# Patient Record
Sex: MALE | Race: WHITE | Employment: OTHER | URBAN - METROPOLITAN AREA
[De-identification: names, ages, dates, MRNs, and addresses within clinical notes are randomized per-mention and may not be internally consistent; named-entity substitution may affect disease eponyms.]

---

## 2017-01-17 ENCOUNTER — OFFICE VISIT (OUTPATIENT)
Dept: INTERNAL MEDICINE CLINIC | Age: 77
End: 2017-01-17

## 2017-01-17 VITALS
TEMPERATURE: 97.4 F | HEIGHT: 70 IN | RESPIRATION RATE: 18 BRPM | DIASTOLIC BLOOD PRESSURE: 76 MMHG | SYSTOLIC BLOOD PRESSURE: 119 MMHG | OXYGEN SATURATION: 98 % | WEIGHT: 176.8 LBS | BODY MASS INDEX: 25.31 KG/M2 | HEART RATE: 63 BPM

## 2017-01-17 DIAGNOSIS — Z12.5 ENCOUNTER FOR PROSTATE CANCER SCREENING: ICD-10-CM

## 2017-01-17 DIAGNOSIS — F51.01 PRIMARY INSOMNIA: ICD-10-CM

## 2017-01-17 DIAGNOSIS — I25.83 CORONARY ARTERY DISEASE DUE TO LIPID RICH PLAQUE: Primary | ICD-10-CM

## 2017-01-17 DIAGNOSIS — Z95.1 S/P CABG X 4: ICD-10-CM

## 2017-01-17 DIAGNOSIS — Z00.00 ROUTINE MEDICAL EXAM: ICD-10-CM

## 2017-01-17 DIAGNOSIS — R35.0 URINARY FREQUENCY: ICD-10-CM

## 2017-01-17 DIAGNOSIS — J30.0 VASOMOTOR RHINITIS: ICD-10-CM

## 2017-01-17 DIAGNOSIS — E78.00 HYPERCHOLESTEROLEMIA: ICD-10-CM

## 2017-01-17 DIAGNOSIS — I25.10 CORONARY ARTERY DISEASE DUE TO LIPID RICH PLAQUE: Primary | ICD-10-CM

## 2017-01-17 RX ORDER — IPRATROPIUM BROMIDE 21 UG/1
SPRAY, METERED NASAL
Qty: 60 ML | Refills: 3 | Status: SHIPPED | OUTPATIENT
Start: 2017-01-17 | End: 2019-01-15 | Stop reason: SDUPTHER

## 2017-01-17 NOTE — PATIENT INSTRUCTIONS
TRY CLARITIN, ALLEGRA, OR ZYRTEC FOR NASAL SYMPTOMS. Insomnia: Care Instructions  Your Care Instructions  Insomnia is the inability to sleep well. It is a common problem for most people at some time. Insomnia may make it hard for you to get to sleep, stay asleep, or sleep as long as you need to. This can make you tired and grouchy during the day. It can also make you forgetful, less effective at work, and unhappy. Insomnia can be caused by conditions such as depression or anxiety. Pain can also affect your ability to sleep. When these problems are solved, the insomnia usually clears up. But sometimes bad sleep habits can cause insomnia. If insomnia is affecting your work or your enjoyment of life, you can take steps to improve your sleep. Follow-up care is a key part of your treatment and safety. Be sure to make and go to all appointments, and call your doctor if you are having problems. It's also a good idea to know your test results and keep a list of the medicines you take. How can you care for yourself at home? What to avoid  · Do not have drinks with caffeine, such as coffee or black tea, for 8 hours before bed. · Do not smoke or use other types of tobacco near bedtime. Nicotine is a stimulant and can keep you awake. · Avoid drinking alcohol late in the evening, because it can cause you to wake in the middle of the night. · Do not eat a big meal close to bedtime. If you are hungry, eat a light snack. · Do not drink a lot of water close to bedtime, because the need to urinate may wake you up during the night. · Do not read or watch TV in bed. Use the bed only for sleeping and sexual activity. What to try  · Go to bed at the same time every night, and wake up at the same time every morning. Do not take naps during the day. · Keep your bedroom quiet, dark, and cool. · Sleep on a comfortable pillow and mattress.   · If watching the clock makes you anxious, turn it facing away from you so you cannot see the time. · If you worry when you lie down, start a worry book. Well before bedtime, write down your worries, and then set the book and your concerns aside. · Try meditation or other relaxation techniques before you go to bed. · If you cannot fall asleep, get up and go to another room until you feel sleepy. Do something relaxing. Repeat your bedtime routine before you go to bed again. · Make your house quiet and calm about an hour before bedtime. Turn down the lights, turn off the TV, log off the computer, and turn down the volume on music. This can help you relax after a busy day. When should you call for help? Watch closely for changes in your health, and be sure to contact your doctor if:  · Your efforts to improve your sleep do not work. · Your insomnia gets worse. · You have been feeling down, depressed, or hopeless or have lost interest in things that you usually enjoy. Where can you learn more? Go to http://miranda-wilfredo.info/. Enter P513 in the search box to learn more about \"Insomnia: Care Instructions. \"  Current as of: July 26, 2016  Content Version: 11.1  © 6550-7359 [employer]. Care instructions adapted under license by [employer] (which disclaims liability or warranty for this information). If you have questions about a medical condition or this instruction, always ask your healthcare professional. [place] [address] any warranty or liability for your use of this information.

## 2017-01-17 NOTE — PROGRESS NOTES
He is a 77 y.o. male who presents for follow up on medications. Seen in 2016. He sleeps 4 hours at a time. Normal sleep latency. Sleep maintenance problems. Denies RLS. Some nocturia. Tried melatonin and benadryl in the past. Denies fatigue. Not interested in sleep medication. Some daytime naps. No caffeine. Reports rhinitis. Using atrovent with relief with postnasal drip. Prior flonase without relief. Has not tried over the counter antihistamines in the past and wants to try claritin. History of CAD, no sx with exertion. No falls. Gait normal. Denies chest pain, fatigue, or KHAN. Cannot tolerate statins. No prior niacin, \"I am afraid of that\". He states cholesterol does not cause heart disease. Back on aspirin. Sees cardiologist once a year. Walking one miles a day, brisk. Sees derm for skin cancer, had effudex treatment recently. ROS:  Constitutional: negative for fevers, chills, anorexia and weight loss, positive for insomnia  Eyes:   negative for visual disturbance,  irritation  ENT:   negative for tinnitus,sore throat, ear pain, sinus pain.  Positive for nasal congestion  Respiratory:  negative for cough, chest congestion, dyspnea,wheezing  CV:   negative for chest pain, palpitations, lower extremity edema  GI:   negative for nausea, vomiting, diarrhea, abdominal pain,melena  Genitourinary: negative for frequency, dysuria, hematuria  Musculoskel: negative for myalgias, arthralgias, back pain, muscle weakness, joint pain  Neurological:  negative for headaches, dizziness, focal weakness, numbness      Past Medical History   Diagnosis Date    Allergic rhinitis     Benign prostatic hypertrophy     CAD (coronary artery disease)      anterior MI ;  by cath 2008    Contact dermatitis and other eczema, due to unspecified cause      Actinic Keratoses Head, limbs    Depression     Hypercholesterolemia     Amando-Parkinson-White (WPW) syndrome      status post ablation Past Surgical History   Procedure Laterality Date    Endoscopy, colon, diagnostic  2004     neg; 10 years; Dr. Prasanna Martino Pr colon ca scrn not hi rsk ind  2/4/2014          Hx colonoscopy  2/5/14     Repeat in 5 years   [employer] Hx vasectomy  1987     Dr. Lainey Yin, artery-vein, four  2006    Pr cardiac surg procedure unlist  2004, 2006    Hx heent       left eye surgery (Veatrice Beltrán on the eye)    Hx orthopaedic       meniscus repair ? right       Family History   Problem Relation Age of Onset    Asthma Paternal Grandmother     Cancer Father      Brain    Cancer Mother      Pancreas CA    Cancer Paternal Uncle     Heart Disease Maternal Grandfather     Heart Disease Brother     Heart Attack Brother      at least 3 (first in his 45s)    Stroke Maternal Grandmother     No Known Problems Brother     No Known Problems Son     No Known Problems Son     No Known Problems Daughter        Social History     Social History    Marital status:      Spouse name: N/A    Number of children: N/A    Years of education: N/A     Occupational History    Not on file. Social History Main Topics    Smoking status: Never Smoker    Smokeless tobacco: Never Used    Alcohol use No    Drug use: No    Sexual activity: Not Currently     Partners: Female     Other Topics Concern    Not on file     Social History Narrative         Current Outpatient Prescriptions:     ipratropium (ATROVENT) 0.03 % nasal spray, 1-2 sprays three times a day as needed. , Disp: 60 mL, Rfl: 3    polyvinyl alcohol (LIQUIFILM TEARS) 1.4 % ophthalmic solution, Administer 2 Drops to both eyes every morning. Indications: DRY EYE, Disp: , Rfl:     ketoconazole (NIZORAL) 2 % topical cream, Apply  to affected area two (2) times daily as needed. For jock itch rash. Dispense 90 day supply, Disp: 90 g, Rfl: 3    aspirin delayed-release 81 mg tablet, Take 81 mg by mouth daily. , Disp: , Rfl:        Visit Vitals    /76 (BP 1 Location: Left arm, BP Patient Position: Sitting)    Pulse 63    Temp 97.4 °F (36.3 °C) (Oral)    Resp 18    Ht 5' 10\" (1.778 m)    Wt 176 lb 12.8 oz (80.2 kg)    SpO2 98%    BMI 25.37 kg/m2       Physical Examination:   General - Well appearing male  HEENT - PERRL, TM no erythema/opacification, OP no erythema or exudate, MMM  Neck - supple, no bruits, no TMG, no LAD  Pulm - clear to auscultation bilaterally  Cardio - RRR, normal S1 S2, no murmur  Abd - soft, nontender, no masses, no HSM  Extrem - no edema, +2 distal pulses     Assessment/Plan:    1. Vasomotor rhinitis- trial of claritin    - ipratropium (ATROVENT) 0.03 % nasal spray; 1-2 sprays three times a day as needed. Dispense: 60 mL; Refill: 3    2. Routine medical exam- Recommend heart healthy diet and regular cardiovascular exercise. 3. Coronary artery disease due to lipid rich plaque- follow up with cardiology    - CBC W/O DIFF  - METABOLIC PANEL, COMPREHENSIVE    4. Hypercholesterolemia-intolerant of statins, refuses to try any other medication for statins.     - LIPID PANEL    5. S/P CABG x 4      6. Encounter for prostate cancer screening    - PSA W/ REFLX FREE PSA    7. Urinary frequency    - PSA W/ REFLX FREE PSA    8. Primary insomnia- Discussed sleep hygiene. Avoid caffeine. Use relaxation techniques. Medication as directed. Follow-up Disposition:  Return for followup pending labs and annually.     Vale Marinelli MD

## 2017-01-17 NOTE — PROGRESS NOTES
Reviewed record in preparation for visit and have obtained necessary documentation. Identified pt with two pt identifiers(name and ).         Coordination of Care Questionnaire:  :     1) Have you been to an emergency room, urgent care clinic since your last visit? no   Hospitalized since your last visit? no             2) Have you seen or consulted any other health care providers outside of [facility] since your last visit? no  (Include any pap smears or colon screenings in this section.)

## 2017-01-17 NOTE — MR AVS SNAPSHOT
Visit Information Date & Time Provider Department Dept. Phone Encounter #  
 1/17/2017  8:30 AM Ruthie Sakina, [address] [place] [address],[address] [phone] [id number] Upcoming Health Maintenance Date Due  
 MEDICARE YEARLY EXAM 7/14/2017 GLAUCOMA SCREENING Q2Y 1/14/2018 COLONOSCOPY 2/4/2019 DTaP/Tdap/Td series (2 - Td) 10/13/2025 Allergies as of 1/17/2017  Review Complete On: 1/17/2017 By: Ruthie Presley MD  
  
 Severity Noted Reaction Type Reactions Amoxicillin  11/16/2009    Hives Atorvastatin  01/08/2014   Side Effect Other (comments) Felt bad; constipation; urine dark but no myalgia Pravastatin  11/17/2009    Other (comments) Difficulty raising right toe and issues walking Simvastatin  11/17/2009    Myalgia Current Immunizations  Reviewed on 1/17/2017 Name Date Influenza High Dose Vaccine PF 10/1/2014 Influenza Vaccine 10/18/2016, 10/13/2015, 9/26/2013 Influenza Vaccine Split 10/5/2011 Pneumococcal Conjugate (PCV-13) 10/13/2015 Pneumococcal Vaccine (Unspecified Type) 1/1/2009 Tdap 10/13/2015 Zoster Vaccine, Live 10/13/2015 Reviewed by Ruthie Presley MD on 1/17/2017 at  9:13 AM  
You Were Diagnosed With   
  
 Codes Comments Coronary artery disease due to lipid rich plaque    -  Primary ICD-10-CM: I25.10, I25.83 ICD-9-CM: 414.00, 414.3 Vasomotor rhinitis     ICD-10-CM: J30.0 ICD-9-CM: 477.9 Routine medical exam     ICD-10-CM: Z00.00 ICD-9-CM: V70.0 Hypercholesterolemia     ICD-10-CM: E78.00 ICD-9-CM: 272.0 S/P CABG x 4     ICD-10-CM: Z95.1 ICD-9-CM: V45.81 Encounter for prostate cancer screening     ICD-10-CM: Z12.5 ICD-9-CM: V76.44 Urinary frequency     ICD-10-CM: R35.0 ICD-9-CM: 419. 39 Primary insomnia     ICD-10-CM: F51.01 
ICD-9-CM: 307.42 Vitals BP Pulse Temp Resp Height(growth percentile) Weight(growth percentile) 119/76 (BP 1 Location: Left arm, BP Patient Position: Sitting) 63 97.4 °F (36.3 °C) (Oral) 18 5' 10\" (1.778 m) 176 lb 12.8 oz (80.2 kg) SpO2 BMI Smoking Status 98% 25.37 kg/m2 Never Smoker Vitals History BMI and BSA Data Body Mass Index Body Surface Area  
 25.37 kg/m 2 1.99 m 2 Preferred Pharmacy Pharmacy Name Phone [facility] [phone] Your Updated Medication List  
  
   
This list is accurate as of: 17  9:13 AM.  Always use your most recent med list.  
  
  
  
  
 aspirin delayed-release 81 mg tablet Take 81 mg by mouth daily. ipratropium 0.03 % nasal spray Commonly known as:  ATROVENT  
1-2 sprays three times a day as needed. ketoconazole 2 % topical cream  
Commonly known as:  NIZORAL Apply  to affected area two (2) times daily as needed. For jock itch rash. Dispense 90 day supply  
  
 polyvinyl alcohol 1.4 % ophthalmic solution Commonly known as:  Yoselin Ross Administer 2 Drops to both eyes every morning. Indications: DRY EYE Prescriptions Sent to Pharmacy Refills  
 ipratropium (ATROVENT) 0.03 % nasal spray 3 Si-2 sprays three times a day as needed. Class: Normal  
 Pharmacy: [address] [place] [address], [address] [place] [address] #: [phone] We Performed the Following CBC W/O DIFF [12054 CPT(R)] LIPID PANEL [83949 CPT(R)] METABOLIC PANEL, COMPREHENSIVE [39827 CPT(R)] PSA W/ REFLX FREE PSA [22008 CPT(R)] Patient Instructions TRY CLARITIN, ALLEGRA, OR ZYRTEC FOR NASAL SYMPTOMS. Insomnia: Care Instructions Your Care Instructions Insomnia is the inability to sleep well. It is a common problem for most people at some time. Insomnia may make it hard for you to get to sleep, stay asleep, or sleep as long as you need to.  This can make you tired and grouchy during the day. It can also make you forgetful, less effective at work, and unhappy. Insomnia can be caused by conditions such as depression or anxiety. Pain can also affect your ability to sleep. When these problems are solved, the insomnia usually clears up. But sometimes bad sleep habits can cause insomnia. If insomnia is affecting your work or your enjoyment of life, you can take steps to improve your sleep. Follow-up care is a key part of your treatment and safety. Be sure to make and go to all appointments, and call your doctor if you are having problems. It's also a good idea to know your test results and keep a list of the medicines you take. How can you care for yourself at home? What to avoid · Do not have drinks with caffeine, such as coffee or black tea, for 8 hours before bed. · Do not smoke or use other types of tobacco near bedtime. Nicotine is a stimulant and can keep you awake. · Avoid drinking alcohol late in the evening, because it can cause you to wake in the middle of the night. · Do not eat a big meal close to bedtime. If you are hungry, eat a light snack. · Do not drink a lot of water close to bedtime, because the need to urinate may wake you up during the night. · Do not read or watch TV in bed. Use the bed only for sleeping and sexual activity. What to try · Go to bed at the same time every night, and wake up at the same time every morning. Do not take naps during the day. · Keep your bedroom quiet, dark, and cool. · Sleep on a comfortable pillow and mattress. · If watching the clock makes you anxious, turn it facing away from you so you cannot see the time. · If you worry when you lie down, start a worry book. Well before bedtime, write down your worries, and then set the book and your concerns aside. · Try meditation or other relaxation techniques before you go to bed.  
· If you cannot fall asleep, get up and go to another room until you feel sleepy. Do something relaxing. Repeat your bedtime routine before you go to bed again. · Make your house quiet and calm about an hour before bedtime. Turn down the lights, turn off the TV, log off the computer, and turn down the volume on music. This can help you relax after a busy day. When should you call for help? Watch closely for changes in your health, and be sure to contact your doctor if: 
· Your efforts to improve your sleep do not work. · Your insomnia gets worse. · You have been feeling down, depressed, or hopeless or have lost interest in things that you usually enjoy. Where can you learn more? Go to http://miranda-wilfredo.info/. Enter P513 in the search box to learn more about \"Insomnia: Care Instructions. \" Current as of: July 26, 2016 Content Version: 11.1 © 5786-7836 [employer]. Care instructions adapted under license by [employer] (which disclaims liability or warranty for this information). If you have questions about a medical condition or this instruction, always ask your healthcare professional. [address] any warranty or liability for your use of this information. Introducing [facility]! Dear Austyn: Thank you for requesting a fitogram account. Our records indicate that you already have an active fitogram account. You can access your account anytime at [email or web address] Did you know that you can access your hospital and ER discharge instructions at any time in fitogram? You can also review all of your test results from your hospital stay or ER visit. Additional Information If you have questions, please visit the Frequently Asked Questions section of the fitogram website at [email or web address] Remember, fitogram is NOT to be used for urgent needs. For medical emergencies, dial 911. Now available from your iPhone and Android! Please provide this summary of care documentation to your next provider. Your primary care clinician is listed as Jazmine Ulrich. If you have any questions after today's visit, please call [phone].

## 2017-01-18 LAB
ALBUMIN SERPL-MCNC: 4.4 G/DL (ref 3.5–4.8)
ALBUMIN/GLOB SERPL: 1.8 {RATIO} (ref 1.1–2.5)
ALP SERPL-CCNC: 107 IU/L (ref 39–117)
ALT SERPL-CCNC: 19 IU/L (ref 0–44)
AST SERPL-CCNC: 21 IU/L (ref 0–40)
BILIRUB SERPL-MCNC: 0.7 MG/DL (ref 0–1.2)
BUN SERPL-MCNC: 13 MG/DL (ref 8–27)
BUN/CREAT SERPL: 12 (ref 10–22)
CALCIUM SERPL-MCNC: 9.4 MG/DL (ref 8.6–10.2)
CHLORIDE SERPL-SCNC: 103 MMOL/L (ref 96–106)
CHOLEST SERPL-MCNC: 132 MG/DL (ref 100–199)
CO2 SERPL-SCNC: 22 MMOL/L (ref 18–29)
CREAT SERPL-MCNC: 1.11 MG/DL (ref 0.76–1.27)
ERYTHROCYTE [DISTWIDTH] IN BLOOD BY AUTOMATED COUNT: 14 % (ref 12.3–15.4)
GLOBULIN SER CALC-MCNC: 2.4 G/DL (ref 1.5–4.5)
GLUCOSE SERPL-MCNC: 93 MG/DL (ref 65–99)
HCT VFR BLD AUTO: 45.5 % (ref 37.5–51)
HDLC SERPL-MCNC: 36 MG/DL
HGB BLD-MCNC: 15.4 G/DL (ref 12.6–17.7)
LDLC SERPL CALC-MCNC: 83 MG/DL (ref 0–99)
MCH RBC QN AUTO: 30.3 PG (ref 26.6–33)
MCHC RBC AUTO-ENTMCNC: 33.8 G/DL (ref 31.5–35.7)
MCV RBC AUTO: 90 FL (ref 79–97)
PLATELET # BLD AUTO: 147 X10E3/UL (ref 150–379)
POTASSIUM SERPL-SCNC: 4.4 MMOL/L (ref 3.5–5.2)
PROT SERPL-MCNC: 6.8 G/DL (ref 6–8.5)
PSA SERPL-MCNC: 1.8 NG/ML (ref 0–4)
RBC # BLD AUTO: 5.08 X10E6/UL (ref 4.14–5.8)
REFLEX CRITERIA: NORMAL
SODIUM SERPL-SCNC: 144 MMOL/L (ref 134–144)
TRIGL SERPL-MCNC: 65 MG/DL (ref 0–149)
VLDLC SERPL CALC-MCNC: 13 MG/DL (ref 5–40)
WBC # BLD AUTO: 4.3 X10E3/UL (ref 3.4–10.8)

## 2017-07-12 ENCOUNTER — APPOINTMENT (OUTPATIENT)
Dept: INTERNAL MEDICINE CLINIC | Age: 77
End: 2017-07-12

## 2017-07-12 ENCOUNTER — OFFICE VISIT (OUTPATIENT)
Dept: INTERNAL MEDICINE CLINIC | Age: 77
End: 2017-07-12

## 2017-07-12 VITALS
TEMPERATURE: 98.1 F | HEIGHT: 70 IN | OXYGEN SATURATION: 96 % | BODY MASS INDEX: 25.05 KG/M2 | DIASTOLIC BLOOD PRESSURE: 82 MMHG | SYSTOLIC BLOOD PRESSURE: 125 MMHG | HEART RATE: 87 BPM | WEIGHT: 175 LBS | RESPIRATION RATE: 16 BRPM

## 2017-07-12 DIAGNOSIS — Z13.31 SCREENING FOR DEPRESSION: ICD-10-CM

## 2017-07-12 DIAGNOSIS — Z00.00 ROUTINE GENERAL MEDICAL EXAMINATION AT A HEALTH CARE FACILITY: ICD-10-CM

## 2017-07-12 DIAGNOSIS — Z12.11 SCREEN FOR COLON CANCER: ICD-10-CM

## 2017-07-12 RX ORDER — ASPIRIN 325 MG
650 TABLET ORAL AS NEEDED
COMMUNITY
End: 2017-07-12 | Stop reason: CLARIF

## 2017-07-12 RX ORDER — ASPIRIN 325 MG
650 TABLET, DELAYED RELEASE (ENTERIC COATED) ORAL
COMMUNITY
End: 2018-02-19

## 2017-07-12 NOTE — MR AVS SNAPSHOT
Visit Information Date & Time Provider Department Dept. Phone Encounter #  
 7/12/2017  1:00 PM Nadine Sneed, [address] [phone] [id number] Upcoming Health Maintenance Date Due INFLUENZA AGE 9 TO ADULT 8/1/2017 GLAUCOMA SCREENING Q2Y 1/14/2018 MEDICARE YEARLY EXAM 7/13/2018 COLONOSCOPY 2/4/2019 DTaP/Tdap/Td series (2 - Td) 10/13/2025 Allergies as of 7/12/2017  Review Complete On: 7/12/2017 By: Nadine Sneed PHARMD  
  
 Severity Noted Reaction Type Reactions Amoxicillin  11/16/2009    Hives Atorvastatin  01/08/2014   Side Effect Other (comments) Felt bad; constipation; urine dark but no myalgia Pravastatin  11/17/2009    Other (comments) Difficulty raising right toe and issues walking Simvastatin  11/17/2009    Myalgia Current Immunizations  Reviewed on 7/12/2017 Name Date Influenza High Dose Vaccine PF 10/1/2014 Influenza Vaccine 10/18/2016, 10/13/2015, 9/26/2013 Influenza Vaccine Split 10/5/2011 Pneumococcal Conjugate (PCV-13) 10/13/2015 Pneumococcal Polysaccharide (PPSV-23) 1/1/2009 Pneumococcal Vaccine (Unspecified Type) 1/1/2009 Tdap 10/13/2015 Zoster Vaccine, Live 10/13/2015 Reviewed by Nadine Sneed PHARMD on 7/12/2017 at  1:21 PM  
 Reviewed by MICHELLE CooperD on 7/12/2017 at  1:22 PM  
You Were Diagnosed With   
  
 Codes Comments Routine general medical examination at a health care facility     ICD-10-CM: Z00.00 ICD-9-CM: V70.0 Screening for depression     ICD-10-CM: Z13.89 ICD-9-CM: V79.0 Screen for colon cancer     ICD-10-CM: Z12.11 ICD-9-CM: V76.51 Vitals BP Pulse Temp Resp Height(growth percentile) Weight(growth percentile) 125/82 (BP 1 Location: Left arm, BP Patient Position: Sitting) 87 98.1 °F (36.7 °C) (Oral) 16 5' 10\" (1.778 m) 175 lb (79.4 kg) SpO2 BMI Smoking Status 96% 25.11 kg/m2 Never Smoker BMI and BSA Data Body Mass Index Body Surface Area  
 25.11 kg/m 2 1.98 m 2 Your Updated Medication List  
  
   
This list is accurate as of: 7/12/17  1:37 PM.  Always use your most recent med list.  
  
  
  
  
 * aspirin delayed-release 81 mg tablet Take 81 mg by mouth daily. * aspirin delayed-release 325 mg tablet Take 650 mg by mouth every six (6) hours as needed for Pain.  
  
 ipratropium 0.03 % nasal spray Commonly known as:  ATROVENT  
1-2 sprays three times a day as needed. ketoconazole 2 % topical cream  
Commonly known as:  NIZORAL Apply  to affected area two (2) times daily as needed. For jock itch rash. Dispense 90 day supply  
  
 polyvinyl alcohol 1.4 % ophthalmic solution Commonly known as:  Ritika Garcia Administer 1-2 Drops to both eyes daily. Indications: DRY EYE  
  
 * Notice: This list has 2 medication(s) that are the same as other medications prescribed for you. Read the directions carefully, and ask your doctor or other care provider to review them with you. We Performed the Following Nicole  [[id number] [facility] [place]] OCCULT BLOOD, IMMUNOASSAY (FIT) [id number] CPT(R)] Patient Instructions Medicare Part B Preventive Services Limitations Recommendation Scheduled Bone Mass Measurement 
(age 72 & older, biennial) Requires diagnosis related to osteoporosis or estrogen deficiency. Biennial benefit unless patient has history of long-term glucocorticoid tx or baseline is needed because initial test was by other method N/A N/A Cardiovascular Screening Blood Tests (every 5 years) Total cholesterol, HDL, Triglycerides Order as a panel if possible Last:  1/17/17 Every Year Next: 1/18 Colorectal Cancer Screening 
-Fecal occult blood test (annual) -Flexible sigmoidoscopy (5y) 
-Screening colonoscopy (10y) -Barium Enema  Last:  2/4/14 Repeat in 5 years Next: 2/2019, FIT test today to return Counseling to Prevent Tobacco Use (up to 8 sessions per year) - Counseling greater than 3 and up to 10 minutes - Counseling greater than 10 minutes Patients must be asymptomatic of tobacco-related conditions to receive as preventive service N/A N/A Diabetes Screening Tests (at least every 3 years, Medicare covers annually or at 6-month intervals for prediabetic patients) Fasting blood sugar (FBS) or glucose tolerance test (GTT) Patient must be diagnosed with one of the following: 
-Hypertension, Dyslipidemia, obesity, previous impaired FBS or GTT 
Or any two of the following: overweight, FH of diabetes, age ? 72, history of gestational diabetes, birth of baby weighing more than 9 pounds Last: 1/17/17 Every 3 years but checked yearly with labs Next: 1/2018 Diabetes Self-Management Training (DSMT) (no USPSTF recommendation) Requires referral by treating physician for patient with diabetes or renal disease. 10 hours of initial DSMT session of no less than 30 minutes each in a continuous 12-month period. 2 hours of follow-up DSMT in subsequent years. N/A N/A Glaucoma Screening (no USPSTF recommendation) Diabetes mellitus, family history, , age 48 or over,  American, age 72 or over Last: 12/15/2016 Every year Next: 12/2017 Human Immunodeficiency Virus (HIV) Screening (annually for increased risk patients) HIV-1 and HIV-2 by EIA, DUONG, rapid antibody test, or oral mucosa transudate Patient must be at increased risk for HIV infection per USPSTF guidelines or pregnant. Tests covered annually for patients at increased risk. Pregnant patients may receive up to 3 test during pregnancy. N/A N/A Medical Nutrition Therapy (MNT) (for diabetes or renal disease not recommended schedule) Requires referral by treating physician for patient with diabetes or renal disease.   Can be provided in same year as diabetes self-management training (DSMT), and CMS recommends medical nutrition therapy take place after DSMT. Up to 3 hours for initial year and 2 hours in subsequent years. N/A N/A Prostate Cancer Screening (annually up to age 76) - Digital rectal exam (WILLIS) - Prostate specific antigen (PSA) Annually (age 48 or over), WILLIS not paid separately when covered E/M service is provided on same date Last: 1/17/17 Next:  Discuss with your doctor if this test is appropriate for you Seasonal Influenza Vaccination (annually)  Last: 10/18/16 Every Fall Please get one this Fall Shingles Vaccination A shingles vaccine is also recommended once in a lifetime after age 61  Last: 10/13/15 Completed Pneumococcal Vaccination (once after 65)  Last: 
Prevnar 13: 10/13/15 Pneumovax: 1/1/2009 Completed Hepatitis B Vaccinations (if medium/high risk) Medium/high risk factors:  End-stage renal disease, Hemophiliacs who received Factor VIII or IX concentrates, Clients of institutions for the mentally retarded, Persons who live in the same house as a HepB virus carrier, Homosexual men, Illicit injectable drug abusers. N/A N/A Ultrasound Screening for Abdominal Aortic Aneurysm (AAA) (once) Patient must be referred through IPPE and not have had a screening for abdominal aortic aneurysm before under Medicare. Limited to patients who meet one of the following criteria: 
- Men who are 73-68 years old and have smoked more than 100 cigarettes in their lifetime. 
-Anyone with a FH of AAA 
-Anyone recommended for screening by USPSTF N/A N/A  
N/A:  Not applicable Take Aspirin with food or milk if taking the Aspirin 325 mg tabs, 2 as needed for headaches. Introducing [facility]! Dear Beni Hills: Thank you for requesting a [employer] account. Our records indicate that you already have an active [employer] account. You can access your account anytime at [email or web address] Did you know that you can access your hospital and ER discharge instructions at any time in [employer]? You can also review all of your test results from your hospital stay or ER visit. Additional Information If you have questions, please visit the Frequently Asked Questions section of the [employer] website at [email or web address] Remember, [employer] is NOT to be used for urgent needs. For medical emergencies, dial 911. Now available from your iPhone and Android! Please provide this summary of care documentation to your next provider. Your primary care clinician is listed as Fidel Grijalva. If you have any questions after today's visit, please call [phone].

## 2017-07-12 NOTE — PATIENT INSTRUCTIONS
Medicare Part B Preventive Services Limitations Recommendation Scheduled   Bone Mass Measurement  (age 72 & older, biennial) Requires diagnosis related to osteoporosis or estrogen deficiency. Biennial benefit unless patient has history of long-term glucocorticoid tx or baseline is needed because initial test was by other method N/A N/A   Cardiovascular Screening Blood Tests (every 5 years)  Total cholesterol, HDL, Triglycerides Order as a panel if possible Last:  1/17/17    Every Year Next: 1/18   Colorectal Cancer Screening  -Fecal occult blood test (annual)  -Flexible sigmoidoscopy (5y)  -Screening colonoscopy (10y)  -Barium Enema  Last:  2/4/14    Repeat in 5 years Next: 2/2019, FIT test today to return   Counseling to Prevent Tobacco Use (up to 8 sessions per year)  - Counseling greater than 3 and up to 10 minutes  - Counseling greater than 10 minutes Patients must be asymptomatic of tobacco-related conditions to receive as preventive service N/A N/A   Diabetes Screening Tests (at least every 3 years, Medicare covers annually or at 6-month intervals for prediabetic patients)    Fasting blood sugar (FBS) or glucose tolerance test (GTT) Patient must be diagnosed with one of the following:  -Hypertension, Dyslipidemia, obesity, previous impaired FBS or GTT  Or any two of the following: overweight, FH of diabetes, age ? 72, history of gestational diabetes, birth of baby weighing more than 9 pounds Last: 1/17/17    Every 3 years but checked yearly with labs Next: 1/2018   Diabetes Self-Management Training (DSMT) (no USPSTF recommendation) Requires referral by treating physician for patient with diabetes or renal disease. 10 hours of initial DSMT session of no less than 30 minutes each in a continuous 12-month period. 2 hours of follow-up DSMT in subsequent years.  N/A N/A   Glaucoma Screening (no USPSTF recommendation) Diabetes mellitus, family history, , age 48 or over,  American, age 72 or over Last: 12/15/2016    Every year Next: 12/2017   Human Immunodeficiency Virus (HIV) Screening (annually for increased risk patients)  HIV-1 and HIV-2 by EIA, DUONG, rapid antibody test, or oral mucosa transudate Patient must be at increased risk for HIV infection per USPSTF guidelines or pregnant. Tests covered annually for patients at increased risk. Pregnant patients may receive up to 3 test during pregnancy. N/A N/A   Medical Nutrition Therapy (MNT) (for diabetes or renal disease not recommended schedule) Requires referral by treating physician for patient with diabetes or renal disease. Can be provided in same year as diabetes self-management training (DSMT), and CMS recommends medical nutrition therapy take place after DSMT. Up to 3 hours for initial year and 2 hours in subsequent years. N/A N/A   Prostate Cancer Screening (annually up to age 76)  - Digital rectal exam (WILLIS)  - Prostate specific antigen (PSA) Annually (age 48 or over), WILLIS not paid separately when covered E/M service is provided on same date Last: 1/17/17   Next:  Discuss with your doctor if this test is appropriate for you   Seasonal Influenza Vaccination (annually)  Last: 10/18/16     Every Fall Please get one this Fall   Shingles Vaccination A shingles vaccine is also recommended once in a lifetime after age 61  Last: 10/13/15 Completed    Pneumococcal Vaccination (once after 72)  Last:  Prevnar 13: 10/13/15  Pneumovax: 1/1/2009 Completed   Hepatitis B Vaccinations (if medium/high risk) Medium/high risk factors:  End-stage renal disease,  Hemophiliacs who received Factor VIII or IX concentrates, Clients of institutions for the mentally retarded, Persons who live in the same house as a HepB virus carrier, Homosexual men, Illicit injectable drug abusers.  N/A N/A   Ultrasound Screening for Abdominal Aortic Aneurysm (AAA) (once) Patient must be referred through IPPE and not have had a screening for abdominal aortic aneurysm before under Medicare. Limited to patients who meet one of the following criteria:  - Men who are 73-68 years old and have smoked more than 100 cigarettes in their lifetime.  -Anyone with a FH of AAA  -Anyone recommended for screening by USPSTF N/A N/A   N/A:  Not applicable     Take Aspirin with food or milk if taking the Aspirin 325 mg tabs, 2 as needed for headaches.

## 2017-07-12 NOTE — PROGRESS NOTES
Dr. Marianne Garcia referred 1 [facility], 1940, a 77 y.o. male for a Medicare Annual Wellness Visit (AWV). This is a Subsequent Medicare Annual Wellness Visit providing Personalized Prevention Plan Services (PPPS) (Performed 12 months after initial AWV and PPPS )    I have reviewed the patient's medical history in detail and updated the computerized patient record. History     Past Medical History:   Diagnosis Date    Actinic keratosis, hx of     Actinic Keratoses Head, limbs    Allergic rhinitis     Benign prostatic hypertrophy     CAD (coronary artery disease)     anterior MI ;  by cath 2008    Hypercholesterolemia     Amando-Parkinson-White (WPW) syndrome     status post ablation      Past Surgical History:   Procedure Laterality Date    CABG, ARTERY-VEIN, FOUR  2006    x 4    CARDIAC SURG PROCEDURE UNLIST  ,     ENDOSCOPY, COLON, DIAGNOSTIC      neg; 10 years; Dr. Rio Nunn HX COLONOSCOPY  14    Repeat in 5 years    HX HEENT      left eye surgery (Simeon Amass on the eye)    HX ORTHOPAEDIC      meniscus repair (unknown which side)   Cayla Ross    [place] COLON CA SCRN NOT  W   IND  2014    Treating by dermatologist     Current Outpatient Prescriptions   Medication Sig Dispense Refill    aspirin delayed-release 325 mg tablet Take 650 mg by mouth every six (6) hours as needed for Pain.  ipratropium (ATROVENT) 0.03 % nasal spray 1-2 sprays three times a day as needed. 60 mL 3    polyvinyl alcohol (LIQUIFILM TEARS) 1.4 % ophthalmic solution Administer 1-2 Drops to both eyes daily. Indications: DRY EYE      aspirin delayed-release 81 mg tablet Take 81 mg by mouth daily.  ketoconazole (NIZORAL) 2 % topical cream Apply  to affected area two (2) times daily as needed. For jock itch rash.  Dispense 90 day supply 90 g 3     Allergies   Allergen Reactions    Amoxicillin Hives    Atorvastatin Other (comments)     Felt bad; constipation; urine dark but no myalgia    Pravastatin Other (comments)     Difficulty raising right toe and issues walking    Simvastatin Myalgia     Family History   Problem Relation Age of Onset    Cancer Father 71     Brain    Other Father      Cholitis    Cancer Mother 80     Pancreatic CA    Heart Disease Brother     Heart Attack Brother      at least 3 (first in his 45s)    Stroke Maternal Grandmother     No Known Problems Brother     No Known Problems Son     No Known Problems Son     No Known Problems Daughter     Asthma Paternal Grandmother     Cancer Paternal Uncle     Heart Disease Maternal Grandfather      Social History   Substance Use Topics    Smoking status: Never Smoker    Smokeless tobacco: Never Used    Alcohol use No     Patient Active Problem List   Diagnosis Code    CAD (coronary artery disease) I25.10    Hypercholesterolemia E78.00    Benign prostatic hypertrophy N40.0    Allergic rhinitis J30.9    Musculoskeletal neck pain M54.2    S/P CABG x 4 Z95.1    Vasomotor rhinitis J30.0    Chronic right hip pain M25.551, G89.29    Actinic keratoses L57.0    Elevated blood pressure reading without diagnosis of hypertension R03.0    Benign colon polyp K63.5    Statin intolerance Z78.9    Primary insomnia F51.01       Depression Risk Factor Screening:     PHQ over the last two weeks 7/12/2017   Little interest or pleasure in doing things Not at all   Feeling down, depressed or hopeless Not at all   Total Score PHQ 2 0     Alcohol Risk Factor Screening: On any occasion during the past 3 months, have you had more than 4 drinks containing alcohol? No    Do you average more than 14 drinks per week? No        Functional Ability and Level of Safety:     Hearing Loss   No issues per patient or during visit. Activities of Daily Living   Self-care.    Requires assistance with: no ADLs  ADL Assessment 7/12/2017   Feeding yourself No Help Needed   Getting from bed to chair No Help Needed   Getting dressed No Help Needed   Bathing or showering No Help Needed   Walk across the room (includes cane/walker) No Help Needed   Using the telphone No Help Needed   Taking your medications No Help Needed   Preparing meals No Help Needed   Managing money (expenses/bills) No Help Needed   Moderately strenuous housework (laundry) No Help Needed   Shopping for personal items (toiletries/medicines) No Help Needed   Shopping for groceries No Help Needed   Driving No Help Needed   Climbing a flight of stairs No Help Needed   Getting to places beyond walking distances No Help Needed     Fall Risk   Fall Risk Assessment, last 12 mths 7/12/2017   Able to walk? Yes   Fall in past 12 months? No     Abuse Screen   Patient is not abused  Abuse Screening Questionnaire 7/12/2017   Do you ever feel afraid of your partner? N   Are you in a relationship with someone who physically or mentally threatens you? N   Is it safe for you to go home? Y     Review of Systems   Not required    Physical Examination     Evaluation of Cognitive Function:  Mood/affect:  happy  Appearance: age appropriate, casually dressed and within normal Limits  Family member/caregiver input: States that his short term memory is changing and starting to forget some things. Declines further evaluation unless it becomes worse. Alert and oriented to person, place and time. No exam performed today, Medicare Annual Wellness Visit.     Patient Care Team:  Ernesto Hurt MD as PCP - General (Internal Medicine)  Ximena Gilbert MD as Physician (Cardiology)  Mike Trujillo MD as Physician (Gastroenterology)  Dr. Mamadou Lugo (Optometry)  Maryam Castro MD as Physician (Dermatology)  Tierney Jennings MD as Physician (Ophthalmology)  Rocky Valdes MD (Urology)    Advice/Referrals/Counseling   Education and counseling provided:  End-of-Life planning (with patient's consent)  Colorectal cancer screening tests  Eye exam:  Wants to schedule an appointment with his opthalmologist.  When he reads there are letters that will be blank when looking at the pages. States that he has bilateral cataracts, but currently they are not getting larger and his optometrist has not recommended removal at this time. Assessment/Plan       ICD-10-CM ICD-9-CM    1. Routine general medical examination at a health care facility Z00.00 V70.0 DEPRESSION SCREEN ANNUAL      OCCULT BLOOD, IMMUNOASSAY (FIT)   2. Screening for depression Z13.89 V79.0 DEPRESSION SCREEN ANNUAL   3. Screen for colon cancer Z12.11 V76.51 OCCULT BLOOD, IMMUNOASSAY (FIT)   4. Reviewed medications and side effects in detail. A. Ipratropium:  Reviewed side effects regarding eye and no side effects noted in [employer] or Up to Date that references issues patient is currently experiencing. B.  Loratadine:  States that he tried during the spring and did not notice any difference after taking. C. Meds updated and reviewed during today's visit. Medications Discontinued During This Encounter   Medication Reason    aspirin (ASPIRIN) 325 mg tablet Formulary Change     5. Immunizations:  Up to date. 6.  Preventive Screenings:   A. Colorectal Cancer Screening:  Requesting a FIT test today for screening. Colonoscopy due 2/2019. B.  Glaucoma:  Last eye exam 12/15/2016 per patient. Due 12/2017.  7.  Advanced Directives:  Does not have in place but has information at home. Discussed importance of having his wishes in writing. 8.  Exercise:  Currently utilizing the [employer] program and is up to 1.5-2 miles 4 days/week and he is bench pressing 100 lbs. Patient verbalized understanding of information presented. Answered all of the patient's questions. AVS handed and reviewed with patient.     Bianca Palacio, PHARMD, BCACP

## 2017-07-12 NOTE — PROGRESS NOTES
Reviewed record in preparation for visit and have obtained necessary documentation. Identified pt with two pt identifiers(name and ). Chief Complaint   Patient presents with   [facility] Annual Wellness Visit       There are no preventive care reminders to display for this patient. Mr. Andressa White has a reminder for a \"due or due soon\" health maintenance. I have asked that he discuss health maintenance topic(s) due with His  primary care provider. Coordination of Care Questionnaire:  :     1) Have you been to an emergency room, urgent care clinic since your last visit? no   Hospitalized since your last visit? no             2) Have you seen or consulted any other health care providers outside of [address] [place] [address] since your last visit? no  (Include any pap smears or colon screenings in this section.)    3) Do you have an Advance Directive on file? no    4) Are you interested in receiving information on Advance Directives? YES    Patient is accompanied by self I have received verbal consent from Lars Paredes to discuss any/all medical information while they are present in the room.

## 2017-07-15 LAB — HEMOCCULT STL QL IA: NEGATIVE

## 2017-11-15 ENCOUNTER — PATIENT MESSAGE (OUTPATIENT)
Dept: INTERNAL MEDICINE CLINIC | Age: 77
End: 2017-11-15

## 2017-11-16 NOTE — TELEPHONE ENCOUNTER
From: Julia Becerril  To: Mer Szymanski MD  Sent: 11/15/2017 10:27 AM EST  Subject: Update Medical Information    I had a 'High Test' flu shot for seniors at [facility] on 10/17/2017!

## 2018-01-18 ENCOUNTER — TELEPHONE (OUTPATIENT)
Dept: INTERNAL MEDICINE CLINIC | Age: 78
End: 2018-01-18

## 2018-01-18 ENCOUNTER — OFFICE VISIT (OUTPATIENT)
Dept: INTERNAL MEDICINE CLINIC | Age: 78
End: 2018-01-18

## 2018-01-18 VITALS
SYSTOLIC BLOOD PRESSURE: 103 MMHG | WEIGHT: 172.6 LBS | BODY MASS INDEX: 24.71 KG/M2 | TEMPERATURE: 97.9 F | RESPIRATION RATE: 16 BRPM | HEART RATE: 78 BPM | DIASTOLIC BLOOD PRESSURE: 62 MMHG | HEIGHT: 70 IN | OXYGEN SATURATION: 96 %

## 2018-01-18 DIAGNOSIS — J40 BRONCHITIS: Primary | ICD-10-CM

## 2018-01-18 PROBLEM — F33.9 RECURRENT DEPRESSION (HCC): Status: ACTIVE | Noted: 2018-01-18

## 2018-01-18 RX ORDER — HYDROCODONE POLISTIREX AND CHLORPHENIRAMINE POLISTIREX 10; 8 MG/5ML; MG/5ML
1 SUSPENSION, EXTENDED RELEASE ORAL
Qty: 120 ML | Refills: 0 | Status: CANCELLED | OUTPATIENT
Start: 2018-01-18

## 2018-01-18 RX ORDER — ALBUTEROL SULFATE 0.83 MG/ML
2.5 SOLUTION RESPIRATORY (INHALATION) ONCE
Qty: 1 EACH | Refills: 0
Start: 2018-01-18 | End: 2018-01-18

## 2018-01-18 RX ORDER — HYDROCODONE POLISTIREX AND CHLORPHENIRAMINE POLISTIREX 10; 8 MG/5ML; MG/5ML
1 SUSPENSION, EXTENDED RELEASE ORAL
Qty: 120 ML | Refills: 0 | Status: SHIPPED | OUTPATIENT
Start: 2018-01-18 | End: 2018-02-14

## 2018-01-18 RX ORDER — AZITHROMYCIN 250 MG/1
250 TABLET, FILM COATED ORAL SEE ADMIN INSTRUCTIONS
Qty: 6 TAB | Refills: 0 | Status: SHIPPED | OUTPATIENT
Start: 2018-01-18 | End: 2018-01-23

## 2018-01-18 RX ORDER — ALBUTEROL SULFATE 90 UG/1
2 AEROSOL, METERED RESPIRATORY (INHALATION)
Qty: 1 INHALER | Refills: 0 | Status: SHIPPED | OUTPATIENT
Start: 2018-01-18 | End: 2018-02-14

## 2018-01-18 NOTE — TELEPHONE ENCOUNTER
Received triage call from patient. Complaint of chest congestion, productive cough x 3-4 days. Low grade fever per patient. Scheduled appointment today at [phone] with Dr. Van Lazaro.

## 2018-01-18 NOTE — PROGRESS NOTES
Karen Thorpe is a 69 y.o. male who complains of URI for 10 days. Reports chest congestion, productive cough- green. Sinus congestion with discolored mucous. No facial pain. Has right ear congestion. No ear pain. No fever. Taking over the counter cold medication. Past Medical History:   Diagnosis Date    Actinic keratosis, hx of     Actinic Keratoses Head, limbs    Allergic rhinitis     Benign prostatic hypertrophy     CAD (coronary artery disease)     anterior MI ;  by cath 2008    Hypercholesterolemia     Amando-Parkinson-White (WPW) syndrome     status post ablation         Review of Systems  Pertinent items are noted in HPI. Objective:     Visit Vitals    /62 (BP 1 Location: Left arm, BP Patient Position: Sitting)    Pulse 78    Temp 97.9 °F (36.6 °C) (Oral)    Resp 16    Ht 5' 10\" (1.778 m)    Wt 172 lb 9.6 oz (78.3 kg)    SpO2 96%    BMI 24.77 kg/m2     Gen: Mildly ill appearing male  HEENT:   PERRL,normal conjunctiva. External ear and canals normal, TMs no opacification or erythema,  Swollen nasal turbinates, no sinus pain on palpation,  OP no erythema, no exudates, MMM  Neck:   No masses or LAD  Resp:  Coarse rhonchi, diffuse wheezing. CV:  RRR, normal S1S2, no murmur. Assessment/Plan:       ICD-10-CM ICD-9-CM    1. Bronchitis J40 490 azithromycin (ZITHROMAX) 250 mg tablet      chlorpheniramine-HYDROcodone (TUSSIONEX) 10-8 mg/5 mL suspension      albuterol (PROVENTIL HFA, VENTOLIN HFA, PROAIR HFA) 90 mcg/actuation inhaler      albuterol (PROVENTIL VENTOLIN) 2.5 mg /3 mL (0.083 %) nebulizer solution      ALBUTEROL, INHAL. SOL., FDA-APPROVED FINAL, NON-COMPOUND UNIT DOSE, 1 MG      INHAL RX, AIRWAY OBST/DX SPUTUM INDUCT       Follow-up Disposition:  Return if symptoms worsen or fail to improve.     Wild Schulz MD

## 2018-01-18 NOTE — PROGRESS NOTES
Chief Complaint   Patient presents with    Nasal Congestion     coughing yellowish/ greenish mucus x10 days      Visit Vitals    /62 (BP 1 Location: Left arm, BP Patient Position: Sitting)    Pulse 78    Temp 97.9 °F (36.6 °C) (Oral)    Resp 16    Ht 5' 10\" (1.778 m)    Wt 172 lb 9.6 oz (78.3 kg)    SpO2 96%    BMI 24.77 kg/m2     Reviewed record In preparation for visit and have obtained necessary documentation    1. Have you been to the ER, urgent care clinic since your last visit? Hospitalized since your last visit? NO    2. Have you seen or consulted any other health care providers outside of the [address] [place] [address] since your last visit? Include any pap smears or colon screening. NO    Patient does not have advance directive on file.

## 2018-02-14 ENCOUNTER — OFFICE VISIT (OUTPATIENT)
Dept: INTERNAL MEDICINE CLINIC | Age: 78
End: 2018-02-14

## 2018-02-14 VITALS
BODY MASS INDEX: 24.74 KG/M2 | TEMPERATURE: 97.7 F | SYSTOLIC BLOOD PRESSURE: 116 MMHG | HEART RATE: 61 BPM | OXYGEN SATURATION: 98 % | DIASTOLIC BLOOD PRESSURE: 69 MMHG | WEIGHT: 172.8 LBS | HEIGHT: 70 IN | RESPIRATION RATE: 16 BRPM

## 2018-02-14 DIAGNOSIS — Z13.220 SCREENING FOR HYPERLIPIDEMIA: ICD-10-CM

## 2018-02-14 DIAGNOSIS — I25.83 CORONARY ARTERY DISEASE DUE TO LIPID RICH PLAQUE: Primary | ICD-10-CM

## 2018-02-14 DIAGNOSIS — Z13.1 SCREENING FOR DIABETES MELLITUS: ICD-10-CM

## 2018-02-14 DIAGNOSIS — D69.6 THROMBOCYTOPENIA (HCC): ICD-10-CM

## 2018-02-14 DIAGNOSIS — R35.1 NOCTURIA: ICD-10-CM

## 2018-02-14 DIAGNOSIS — I25.10 CORONARY ARTERY DISEASE DUE TO LIPID RICH PLAQUE: Primary | ICD-10-CM

## 2018-02-14 NOTE — PROGRESS NOTES
Chief Complaint   Patient presents with    Complete Physical     Pt fasting     Visit Vitals    /69 (BP 1 Location: Left arm, BP Patient Position: Sitting)    Pulse 61    Temp 97.7 °F (36.5 °C) (Oral)    Resp 16    Ht 5' 10\" (1.778 m)    Wt 172 lb 12.8 oz (78.4 kg)    SpO2 98%    BMI 24.79 kg/m2     Reviewed record In preparation for visit and have obtained necessary documentation    1. Have you been to the ER, urgent care clinic since your last visit? Hospitalized since your last visit? NO    2. Have you seen or consulted any other health care providers outside of the [address] [place] [address] since your last visit? Include any pap smears or colon screening. NO    Patient does not have advance directive on file and has received paperwork.

## 2018-02-14 NOTE — PROGRESS NOTES
Nayana Warner is a 69 y.o. male who presents for followup. Had medicare wellness exam in 2017. Seen  with bronchitis, treated with zpak, tussionex. Did not need inhaler. Better now. Due for colon screening 2019, prior polyp. Dr. Yun Suarez, GI. Reports constipation. Had eye exam 2017. No change in cataracts. Sees derm for skin cancer, had effudex, needs follow up with derm. He sleeps 4 hours at a time. Normal sleep latency. Sleep maintenance problems. Denies RLS. Some nocturia once. Tried melatonin and benadryl in the past. Not interested in sleep medication. Some daytime naps. No caffeine. Denies any significant fatigue. Reports rhinitis. Using atrovent with relief with postnasal drip. Prior flonase without relief. Tried claritin, no benefit. History of CAD, no sx with exertion. No falls. Gait normal.  Walking one miles a day, brisk. Denies chest pain, fatigue, or KHAN. Normal LDL, elevated small dense LDL subtype. Refused statin in the past. No prior niacin, \"I am afraid of that\". He states cholesterol does not cause heart disease. On aspirin. Sees cardiologist once a year, due for followup. Saw Dr. Freddy Dyson, urology. 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 Nocturia once at night not every night.  Normal urination except weak stream.  PSA normal.           Past Medical History:   Diagnosis Date    Actinic keratosis, hx of     Actinic Keratoses Head, limbs    Allergic rhinitis     Benign prostatic hypertrophy     CAD (coronary artery disease)     anterior MI ;  by cath 2008    Hypercholesterolemia     Amando-Parkinson-White (WPW) syndrome     status post ablation       Family History   Problem Relation Age of Onset    Cancer Father 71     Brain    Other Father      Cholitis    Cancer Mother 80     Pancreatic CA    Heart Disease Brother     Heart Attack Brother      at least 3 (first in his 45s)    Stroke Maternal Grandmother     No Known Problems Brother     No Known Problems Son     No Known Problems Son     No Known Problems Daughter     Asthma Paternal Grandmother     Cancer Paternal Uncle     Heart Disease Maternal Grandfather        Social History     Social History    Marital status:      Spouse name: N/A    Number of children: N/A    Years of education: N/A     Occupational History    Not on file. Social History Main Topics    Smoking status: Never Smoker    Smokeless tobacco: Never Used    Alcohol use No    Drug use: No    Sexual activity: Not Currently     Partners: Female     Other Topics Concern    Not on file     Social History Narrative       Current Outpatient Prescriptions on File Prior to Visit   Medication Sig Dispense Refill    aspirin delayed-release 325 mg tablet Take 650 mg by mouth every six (6) hours as needed for Pain.  ipratropium (ATROVENT) 0.03 % nasal spray 1-2 sprays three times a day as needed. 60 mL 3    polyvinyl alcohol (LIQUIFILM TEARS) 1.4 % ophthalmic solution Administer 1-2 Drops to both eyes daily. Indications: DRY EYE      ketoconazole (NIZORAL) 2 % topical cream Apply  to affected area two (2) times daily as needed. For jock itch rash. Dispense 90 day supply 90 g 3    aspirin delayed-release 81 mg tablet Take 81 mg by mouth daily. No current facility-administered medications on file prior to visit. Review of Systems  Pertinent items are noted in HPI. Objective:     Visit Vitals    /69 (BP 1 Location: Left arm, BP Patient Position: Sitting)    Pulse 61    Temp 97.7 °F (36.5 °C) (Oral)    Resp 16    Ht 5' 10\" (1.778 m)    Wt 172 lb 12.8 oz (78.4 kg)    SpO2 98%    BMI 24.79 kg/m2     Gen: well appearing male  HEENT:   PERRL,normal conjunctiva. External ear and canals normal, TMs no opacification or erythema,  OP no erythema, no exudates, MMM  Neck:  Supple. Thyroid normal size, nontender, without nodules.  No masses or LAD  Resp:  No wheezing, no rhonchi, no rales. CV:  RRR, normal S1S2, no murmur. GI: soft, nontender, without masses. No hepatosplenomegaly. Extrem:  +2 pulses, no edema, warm distally      Assessment/Plan:     1. Screening for hyperlipidemia    - LIPID PANEL    2. Screening for diabetes mellitus    - METABOLIC PANEL, COMPREHENSIVE    3. Coronary artery disease due to lipid rich plaque- stable on medication.     - METABOLIC PANEL, COMPREHENSIVE  - CBC W/O DIFF  - LIPID PANEL    4. Nocturia    - PSA W/ REFLX FREE PSA    5. Thrombocytopenia (HCC)    - CBC W/O DIFF; Future      Follow-up Disposition:  Return in about 6 months (around 8/14/2018) for follow up pending labs and 6 months for medicare wellness.     Mohan Amaya MD

## 2018-02-14 NOTE — PATIENT INSTRUCTIONS
Increase water intake to 6-8 glasses a day, add 30 grams of fiber to diet- fiber gummies, whole grain bread or cereal, oatmeal. Use colace stool softener 1-2 capsules at bedtime routinely if needed. If no BM for 2 days, use miralax laxative as needed.

## 2018-02-14 NOTE — MR AVS SNAPSHOT
[address] [facility] 
[phone] Patient: Farheen Robertson 
MRN:  [id number] Visit Information Date & Time Provider Department Dept. Phone Encounter #  
 2/14/2018  9:30 AM Sunshine Alvarez, [address] [id number] Follow-up Instructions Return in about 6 months (around 8/14/2018) for follow up pending labs and 6 months for medicare wellness. Upcoming Health Maintenance Date Due  
 GLAUCOMA SCREENING Q2Y 1/14/2018 MEDICARE YEARLY EXAM 7/13/2018 COLONOSCOPY 2/4/2019 DTaP/Tdap/Td series (2 - Td) 10/13/2025 Allergies as of 2/14/2018  Review Complete On: 2/14/2018 By: Sunshine Alvarez MD  
  
 Severity Noted Reaction Type Reactions Amoxicillin  11/16/2009    Hives Atorvastatin  01/08/2014   Side Effect Other (comments) Felt bad; constipation; urine dark but no myalgia Pravastatin  11/17/2009    Other (comments) Difficulty raising right toe and issues walking Simvastatin  11/17/2009    Myalgia Current Immunizations  Reviewed on 7/12/2017 Name Date Influenza High Dose Vaccine PF 10/17/2017, 10/1/2014 Influenza Vaccine 10/18/2016, 10/13/2015, 9/26/2013 Influenza Vaccine Split 10/5/2011 Pneumococcal Conjugate (PCV-13) 10/13/2015 Pneumococcal Polysaccharide (PPSV-23) 1/1/2009 Pneumococcal Vaccine (Unspecified Type) 1/1/2009 Tdap 10/13/2015 Zoster Vaccine, Live 10/13/2015 Not reviewed this visit You Were Diagnosed With   
  
 Codes Comments Coronary artery disease due to lipid rich plaque    -  Primary ICD-10-CM: I25.10, I25.83 ICD-9-CM: 414.00, 414.3 Screening for hyperlipidemia     ICD-10-CM: Z13.220 ICD-9-CM: V77.91 Screening for diabetes mellitus     ICD-10-CM: Z13.1 ICD-9-CM: V77.1 Nocturia     ICD-10-CM: R35.1 ICD-9-CM: 788.43 Vitals BP Pulse Temp Resp Height(growth percentile) Weight(growth percentile) 116/69 (BP 1 Location: Left arm, BP Patient Position: Sitting) 61 97.7 °F (36.5 °C) (Oral) 16 5' 10\" (1.778 m) 172 lb 12.8 oz (78.4 kg) SpO2 BMI Smoking Status 98% 24.79 kg/m2 Never Smoker BMI and BSA Data Body Mass Index Body Surface Area 24.79 kg/m 2 1.97 m 2 Preferred Pharmacy Pharmacy Name Phone [facility], [facility] [address] [place] [address] Drive [phone] Your Updated Medication List  
  
   
This list is accurate as of: 2/14/18 10:52 AM.  Always use your most recent med list.  
  
  
  
  
 * aspirin delayed-release 81 mg tablet Take 81 mg by mouth daily. * aspirin delayed-release 325 mg tablet Take 650 mg by mouth every six (6) hours as needed for Pain.  
  
 ipratropium 0.03 % nasal spray Commonly known as:  ATROVENT  
1-2 sprays three times a day as needed. ketoconazole 2 % topical cream  
Commonly known as:  NIZORAL Apply  to affected area two (2) times daily as needed. For jock itch rash. Dispense 90 day supply  
  
 polyvinyl alcohol 1.4 % ophthalmic solution Commonly known as:  Ozzy Marie Administer 1-2 Drops to both eyes daily. Indications: DRY EYE  
  
 * Notice: This list has 2 medication(s) that are the same as other medications prescribed for you. Read the directions carefully, and ask your doctor or other care provider to review them with you. We Performed the Following CBC W/O DIFF [32013 CPT(R)] LIPID PANEL [53005 CPT(R)] METABOLIC PANEL, COMPREHENSIVE [59980 CPT(R)] PSA W/ REFLX FREE PSA [57696 CPT(R)] Follow-up Instructions Return in about 6 months (around 8/14/2018) for follow up pending labs and 6 months for medicare wellness. Patient Instructions Increase water intake to 6-8 glasses a day, add 30 grams of fiber to diet- fiber gummies, whole grain bread or cereal, oatmeal. Use colace stool softener 1-2 capsules at bedtime routinely if needed. If no BM for 2 days, use miralax laxative as needed. Introducing [facility]! Dear Estella Whelan: Thank you for requesting a BubbleGab account. Our records indicate that you already have an active BubbleGab account. You can access your account anytime at [email or web address] Did you know that you can access your hospital and ER discharge instructions at any time in [employer]ubbleGab? You can also review all of your test results from your hospital stay or ER visit. Additional Information If you have questions, please visit the Frequently Asked Questions section of the Bubble[employer] website at [email or web address] Remember, BubbleGab is NOT to be used for urgent needs. For medical emergencies, dial 911. Now available from your iPhone and Android! Please provide this summary of care documentation to your next provider. Your primary care clinician is listed as Eduardo German. If you have any questions after today's visit, please call [phone].

## 2018-02-15 ENCOUNTER — HOSPITAL ENCOUNTER (EMERGENCY)
Age: 78
Discharge: HOME | End: 2018-02-15
Attending: FAMILY MEDICINE

## 2018-02-15 VITALS
WEIGHT: 172 LBS | DIASTOLIC BLOOD PRESSURE: 90 MMHG | TEMPERATURE: 98.1 F | HEIGHT: 70 IN | BODY MASS INDEX: 24.62 KG/M2 | OXYGEN SATURATION: 97 % | HEART RATE: 91 BPM | RESPIRATION RATE: 18 BRPM | SYSTOLIC BLOOD PRESSURE: 122 MMHG

## 2018-02-15 DIAGNOSIS — J10.1 INFLUENZA A: Primary | ICD-10-CM

## 2018-02-15 LAB
ALBUMIN SERPL-MCNC: 4.2 G/DL (ref 3.5–4.8)
ALBUMIN/GLOB SERPL: 1.8 {RATIO} (ref 1.2–2.2)
ALP SERPL-CCNC: 110 IU/L (ref 39–117)
ALT SERPL-CCNC: 17 IU/L (ref 0–44)
AST SERPL-CCNC: 22 IU/L (ref 0–40)
BILIRUB SERPL-MCNC: 0.8 MG/DL (ref 0–1.2)
BUN SERPL-MCNC: 12 MG/DL (ref 8–27)
BUN/CREAT SERPL: 12 (ref 10–24)
CALCIUM SERPL-MCNC: 8.6 MG/DL (ref 8.6–10.2)
CHLORIDE SERPL-SCNC: 102 MMOL/L (ref 96–106)
CHOLEST SERPL-MCNC: 142 MG/DL (ref 100–199)
CO2 SERPL-SCNC: 24 MMOL/L (ref 18–29)
CREAT SERPL-MCNC: 1 MG/DL (ref 0.76–1.27)
ERYTHROCYTE [DISTWIDTH] IN BLOOD BY AUTOMATED COUNT: 13.6 % (ref 12.3–15.4)
GFR SERPLBLD CREATININE-BSD FMLA CKD-EPI: 73 ML/MIN/1.73
GFR SERPLBLD CREATININE-BSD FMLA CKD-EPI: 84 ML/MIN/1.73
GLOBULIN SER CALC-MCNC: 2.4 G/DL (ref 1.5–4.5)
GLUCOSE SERPL-MCNC: 86 MG/DL (ref 65–99)
HCT VFR BLD AUTO: 41.8 % (ref 37.5–51)
HDLC SERPL-MCNC: 38 MG/DL
HGB BLD-MCNC: 14 G/DL (ref 13–17.7)
INFLUENZA A AG (POC): ABNORMAL
INFLUENZA AG (POC) NEGATIVE CTRL.: ABNORMAL
INFLUENZA AG (POC) POSITIVE CTRL.: ABNORMAL
INFLUENZA B AG (POC): ABNORMAL
LDLC SERPL CALC-MCNC: 94 MG/DL (ref 0–99)
LOT NUMBER POC: ABNORMAL
MCH RBC QN AUTO: 29 PG (ref 26.6–33)
MCHC RBC AUTO-ENTMCNC: 33.5 G/DL (ref 31.5–35.7)
MCV RBC AUTO: 87 FL (ref 79–97)
PLATELET # BLD AUTO: 127 X10E3/UL (ref 150–379)
POTASSIUM SERPL-SCNC: 4.1 MMOL/L (ref 3.5–5.2)
PROT SERPL-MCNC: 6.6 G/DL (ref 6–8.5)
PSA SERPL-MCNC: 1.8 NG/ML (ref 0–4)
RBC # BLD AUTO: 4.83 X10E6/UL (ref 4.14–5.8)
REFLEX CRITERIA: NORMAL
SODIUM SERPL-SCNC: 142 MMOL/L (ref 134–144)
TRIGL SERPL-MCNC: 51 MG/DL (ref 0–149)
VLDLC SERPL CALC-MCNC: 10 MG/DL (ref 5–40)
WBC # BLD AUTO: 3.7 X10E3/UL (ref 3.4–10.8)

## 2018-02-15 RX ORDER — OSELTAMIVIR PHOSPHATE 75 MG/1
75 CAPSULE ORAL 2 TIMES DAILY
Qty: 10 CAP | Refills: 0 | Status: SHIPPED | OUTPATIENT
Start: 2018-02-15 | End: 2018-02-20

## 2018-02-15 NOTE — UC PROVIDER NOTE
HPI Comments:   Prasanth Luong is a 69 y.o. male who present complaining of flu-like symptoms: chills, myalgias, dry cough, nasal congestion, runny nose. Symptom onset 1 days ago without any preceding illness. Has not tried any medications. No aggravating or alleviating factors. Symptoms are constant and overall not improved. Promotes no decrease in PO intake of fluids. Denies: severe lethargy, SOB, syncope/near syncope, vomiting/diarrhea, chest pain, chest pain with breathing, labored breathing, severe headache, non-intractable fevers . Hx significant for:     Patient is a 69 y.o. male presenting with cold symptoms. Cold Symptoms    Associated symptoms include rhinorrhea, sore throat and cough. Pertinent negatives include no chest pain, no diarrhea, no nausea, no vomiting, no dysuria, no neck pain, no rash and no wheezing.         Past Medical History:   Diagnosis Date    Actinic keratosis, hx of     Actinic Keratoses Head, limbs    Allergic rhinitis     Benign prostatic hypertrophy     CAD (coronary artery disease)     anterior MI ;  by cath 2008    Hypercholesterolemia     Amando-Parkinson-White (WPW) syndrome     status post ablation        Past Surgical History:   Procedure Laterality Date    CABG, ARTERY-VEIN, FOUR  2006    x 4    CARDIAC SURG PROCEDURE UNLIST  ,     ENDOSCOPY, COLON, DIAGNOSTIC      neg; 10 years; Dr. Daija Ingram HX COLONOSCOPY  14    Repeat in 5 years    HX HEENT      left eye surgery (Ellis Dose on the eye)    HX ORTHOPAEDIC      meniscus repair (unknown which side)   Abel Garcia    [facility]    Treating by dermatologist         Family History   Problem Relation Age of Onset    Cancer Father 71     Brain    Other Father      Cholitis    Cancer Mother 80     Pancreatic CA    Heart Disease Brother     Heart Attack Brother      at least 3 (first in his 45s)    Stroke Maternal Grandmother  No Known Problems Brother     No Known Problems Son     No Known Problems Son     No Known Problems Daughter     Asthma Paternal Grandmother     Cancer Paternal Uncle     Heart Disease Maternal Grandfather         Social History     Social History    Marital status:      Spouse name: N/A    Number of children: N/A    Years of education: N/A     Occupational History    Not on file. Social History Main Topics    Smoking status: Never Smoker    Smokeless tobacco: Never Used    Alcohol use No    Drug use: No    Sexual activity: Not Currently     Partners: Female     Other Topics Concern    Not on file     Social History Narrative                ALLERGIES: Amoxicillin; Atorvastatin; Pravastatin; and Simvastatin    Review of Systems   Constitutional: Positive for chills. Negative for appetite change and fatigue. HENT: Positive for rhinorrhea, sinus pressure and sore throat. Eyes: Negative for photophobia. Respiratory: Positive for cough. Negative for chest tightness, shortness of breath, wheezing and stridor. Cardiovascular: Negative for chest pain, palpitations and leg swelling. Gastrointestinal: Negative for abdominal distention, diarrhea, nausea and vomiting. Genitourinary: Negative for dysuria. Musculoskeletal: Positive for myalgias. Negative for gait problem, neck pain and neck stiffness. Skin: Negative for pallor and rash. Neurological: Negative for dizziness and syncope. Psychiatric/Behavioral: Negative for confusion. Vitals:    02/15/18 1730   BP: 122/90   Pulse: 91   Resp: 18   Temp: 98.1 °F (36.7 °C)   SpO2: 97%   Weight: 78 kg (172 lb)   Height: 5' 10\" (1.778 m)       Physical Exam   Constitutional: He is oriented to person, place, and time. No distress. Non-toxic appearing, well hydrated   HENT:   Mouth/Throat: No oropharyngeal exudate.    TMs normal appearing bilat  Erythematous nasal turbinates with clear rhinorrhea  OP mild erythema without swelling or exudate. Uvula midline. No oral lesions. Eyes: Conjunctivae and EOM are normal. Pupils are equal, round, and reactive to light. No scleral icterus. Neck: Normal range of motion. Neck supple. Cardiovascular: Normal rate, regular rhythm and normal heart sounds. Exam reveals no gallop and no friction rub. No murmur heard. Pulmonary/Chest: Effort normal and breath sounds normal. No respiratory distress. He has no wheezes. He has no rales. Good air movement throughout   Lymphadenopathy:     He has no cervical adenopathy. Neurological: He is alert and oriented to person, place, and time. No cranial nerve deficit. Skin: Skin is warm and dry. No rash noted. He is not diaphoretic. No erythema. No pallor. Psychiatric: He has a normal mood and affect. His behavior is normal. Thought content normal.   Nursing note and vitals reviewed. MDM     Differential Diagnosis; Clinical Impression; Plan:       CLINICAL IMPRESSION:  (J10.1) Influenza A  (primary encounter diagnosis)    Orders Placed This Encounter      POC INFLUENZA A & B SCREEN  RX      oseltamivir (TAMIFLU) 75 mg capsule    Rapid flu positive. No evidence of complication today. Plan:    1. See above orders. Advised tamiflu given higher risk. Informed decision made to start treatment. 2. Review provided handouts  3. Maintain adequate fluid intake and try humidified air at night  4. May use OTC fever reducers PRN as directed, for general aches, pain or fever; check active ingredients to ensure you are not duplicating. We have reviewed concerning signs/symptoms, normal vs abnormal progression of medical condition and when and where to seek immediate medical attention   ED or Urgent Care immediately for worsening or new symptoms. See your PCP if there is not at least some improvement in symptoms within the next 7 days.         Procedures

## 2018-02-15 NOTE — DISCHARGE INSTRUCTIONS

## 2018-02-16 NOTE — PROGRESS NOTES
Normal labs except mildly reduced platelet count. He was having more bruising when last seen. Recommend reduce aspirin to 2 of the 81mg tablets once a day to reduce bruising and recheck platelet count in one month to be sure there is no other process going on. Romanian Pillar

## 2018-02-19 ENCOUNTER — TELEPHONE (OUTPATIENT)
Dept: INTERNAL MEDICINE CLINIC | Age: 78
End: 2018-02-19

## 2018-02-19 NOTE — TELEPHONE ENCOUNTER
----- Message from Mathieu Mcginnis. Logan Dong sent at 2/16/2018  5:40 PM EST -----  Regarding: RE:lab results  Contact: [phone]  I'm confused! I only take one 80 mg aspirin per day. Also, there was no discussion about bruising at my physical on Wednesday. It is true, however, that I do get a severe bruise just about every time I bump something! I seem to remember that the 'mildly reduced' platelet count was an issue last year as well!  ----- Message -----  From: Claudia Arias LPN  Sent: 3/01/5480  8:57 AM EST  To: Mathieu Mcginnis. [facility]  Subject: lab results    Normal labs except mildly reduced platelet count. You were having more bruising when last seen. Recommend reduce aspirin to 2 of the 81mg tablets once a day to reduce bruising and recheck platelet count in one month to be sure there is no other process going on.

## 2018-02-22 ENCOUNTER — TELEPHONE (OUTPATIENT)
Dept: INTERNAL MEDICINE CLINIC | Age: 78
End: 2018-02-22

## 2018-02-22 NOTE — TELEPHONE ENCOUNTER
Called, spoke to pt. Two pt identifiers confirmed. Pt informed biometric form is ready for  and at the . Pt verbalized understanding of information discussed w/ no further questions at this time. Rx placed in blue folder.

## 2018-03-06 ENCOUNTER — TELEPHONE (OUTPATIENT)
Dept: INTERNAL MEDICINE CLINIC | Age: 78
End: 2018-03-06

## 2018-03-06 NOTE — TELEPHONE ENCOUNTER
Called, spoke to pt. Two pt identifiers confirmed. Spoke with Ambreen John  Pt requesting that his Biometric form be faxed . Pt verbalized understanding of information discussed w/ no further questions at this time. Forms faxed to [facility] [phone]  Fax confirmation received.

## 2018-03-06 NOTE — TELEPHONE ENCOUNTER
Patient states he's returning Rupali's call & needs to get his Biometric Screening form faxed over to:    Fax# [phone]    Patient also states he has sent My Chart Messages. Patient requests a call or My Chart message be sent when this has been done.  Thank you

## 2018-03-26 ENCOUNTER — APPOINTMENT (OUTPATIENT)
Dept: INTERNAL MEDICINE CLINIC | Age: 78
End: 2018-03-26

## 2018-03-26 DIAGNOSIS — D69.6 THROMBOCYTOPENIA (HCC): ICD-10-CM

## 2018-03-27 LAB
ERYTHROCYTE [DISTWIDTH] IN BLOOD BY AUTOMATED COUNT: 14.7 % (ref 12.3–15.4)
HCT VFR BLD AUTO: 44.3 % (ref 37.5–51)
HGB BLD-MCNC: 14.8 G/DL (ref 13–17.7)
MCH RBC QN AUTO: 30 PG (ref 26.6–33)
MCHC RBC AUTO-ENTMCNC: 33.4 G/DL (ref 31.5–35.7)
MCV RBC AUTO: 90 FL (ref 79–97)
PLATELET # BLD AUTO: 151 X10E3/UL (ref 150–379)
RBC # BLD AUTO: 4.93 X10E6/UL (ref 4.14–5.8)
WBC # BLD AUTO: 4.2 X10E3/UL (ref 3.4–10.8)

## 2018-07-25 ENCOUNTER — OFFICE VISIT (OUTPATIENT)
Dept: INTERNAL MEDICINE CLINIC | Age: 78
End: 2018-07-25

## 2018-07-25 VITALS
RESPIRATION RATE: 16 BRPM | TEMPERATURE: 97.6 F | DIASTOLIC BLOOD PRESSURE: 79 MMHG | WEIGHT: 173.8 LBS | BODY MASS INDEX: 24.88 KG/M2 | HEART RATE: 63 BPM | SYSTOLIC BLOOD PRESSURE: 123 MMHG | HEIGHT: 70 IN | OXYGEN SATURATION: 96 %

## 2018-07-25 DIAGNOSIS — I25.83 CORONARY ARTERY DISEASE DUE TO LIPID RICH PLAQUE: Primary | ICD-10-CM

## 2018-07-25 DIAGNOSIS — Z00.00 MEDICARE ANNUAL WELLNESS VISIT, SUBSEQUENT: ICD-10-CM

## 2018-07-25 DIAGNOSIS — E78.00 HYPERCHOLESTEROLEMIA: ICD-10-CM

## 2018-07-25 DIAGNOSIS — Z95.1 S/P CABG X 4: ICD-10-CM

## 2018-07-25 DIAGNOSIS — I25.10 CORONARY ARTERY DISEASE DUE TO LIPID RICH PLAQUE: Primary | ICD-10-CM

## 2018-07-25 NOTE — PATIENT INSTRUCTIONS
Medicare Wellness Visit, Male    The best way to live healthy is to have a lifestyle where you eat a well-balanced diet, exercise regularly, limit alcohol use, and quit all forms of tobacco/nicotine, if applicable. Regular preventive services are another way to keep healthy. Preventive services (vaccines, screening tests, monitoring & exams) can help personalize your care plan, which helps you manage your own care. Screening tests can find health problems at the earliest stages, when they are easiest to treat. [facility] follows the current, evidence-based guidelines published by the [facility] ([facility]) when recommending preventive services for our patients. Because we follow these guidelines, sometimes recommendations change over time as research supports it. (For example, a prostate screening blood test is no longer routinely recommended for men with no symptoms.)    Of course, you and your provider may decide to screen more often for some diseases, based on your risk and co-morbidities (chronic disease you are already diagnosed with). Preventive services for you include:    - Medicare offers their members a free annual wellness visit, which is time for you and your primary care provider to discuss and plan for your preventive service needs. Take advantage of this benefit every year!    -All people over age 72 should receive the recommended pneumonia vaccines. Current USPSTF guidelines recommend a series of two vaccines for the best pneumonia protection.     -All adults should have a yearly flu vaccine and a tetanus vaccine every 10 years.  All adults age 61 years should receive a shingles vaccine once in their lifetime.      -All adults age 38-68 years who are overweight should have a diabetes screening test once every three years.     -Other screening tests & preventive services for persons with diabetes include: an eye exam to screen for diabetic retinopathy, a kidney function test, a foot exam, and stricter control over your cholesterol.     -Cardiovascular screening for adults with routine risk involves an electrocardiogram (ECG) at intervals determined by the provider.     -Colorectal cancer screenings should be done for adults age 54-65 years with normal risk. There are a number of acceptable methods of screening for this type of cancer. Each test has its own benefits and drawbacks. Discuss with your provider what is most appropriate for you during your annual wellness visit. The different tests include: colonoscopy (considered the best screening method), a fecal occult blood test, a fecal DNA test, and sigmoidoscopy.    -All adults born between [facility] should be screened once for Hepatitis C.    -An Abdominal Aortic Aneurysm (AAA) Screening is recommended for men age 73-68 who has ever smoked in their lifetime. Here is a list of your current Health Maintenance items (your personalized list of preventive services) with a due date: There are no preventive care reminders to display for this patient.

## 2018-07-25 NOTE — PROGRESS NOTES
Reviewed record in preparation for visit and have obtained necessary documentation. Identified pt with two pt identifiers(name and ). Chief Complaint   Patient presents with   [facility] Annual Wellness Visit     Pt nonfasting    Medication Evaluation       There are no preventive care reminders to display for this patient. Mr. Curtis Gabriel has a reminder for a \"due or due soon\" health maintenance. I have asked that he discuss this further with his primary care provider for follow-up on this health maintenance. Coordination of Care Questionnaire:  :     1) Have you been to an emergency room, urgent care clinic since your last visit? no   Hospitalized since your last visit? no             2) Have you seen or consulted any other health care providers outside of [facility] since your last visit? no  (Include any pap smears or colon screenings in this section.)    3) In the event something were to happen to you and you were unable to speak on your behalf, do you have an Advance Directive/ Living Will in place stating your wishes? no    Do you have an Advance Directive on file? no    4) Are you interested in receiving information on Advance Directives? NO    Patient is accompanied by self I have received verbal consent from Le Stewart to discuss any/all medical information while they are present in the room.

## 2018-07-25 NOTE — PROGRESS NOTES
Apryl John is a 69 y.o. male who presents for follow up. In for medicare wellness. Had medicare wellness exam in 2017. Seen  with bronchitis, treated with zpak, tussionex. Did not need inhaler. Better now. Saw Dr. Francine Moon, urology. He has questions about urolift procedure. Nocturia once at night. Normal urination except weak stream.     Reports cataracts, does not want surgery yet.       Due for colon screening 2019, prior polyp. Dr. Deya Villasenor, GI. Constipation resolved. Having good BM now. Healthy diet. Sees derm for skin cancer, had effudex.      He sleeps 4 hours at a time. Normal sleep latency. He started using sleep mask and sleeping 6 hours sometimes.       Reports rhinitis. Using atrovent with relief with postnasal drip. Prior flonase without relief. Tried claritin, no benefit.       History of CAD, s/p CABG. Sees Dr Vamshi Olivera, cardiology. Not on statin. On aspirin every other day. Going to fitness 4 days a week. Denies chest pain, fatigue, or KHAN.      Reviewed labs from 2018.       Past Medical History:   Diagnosis Date    Actinic keratosis, hx of     Actinic Keratoses Head, limbs    Allergic rhinitis     Benign prostatic hypertrophy     CAD (coronary artery disease)     anterior MI ;  by cath 2008    Hypercholesterolemia     Amando-Parkinson-White (WPW) syndrome     status post ablation       Family History   Problem Relation Age of Onset    Cancer Father 71     Brain    Other Father      Cholitis    Cancer Mother 80     Pancreatic CA    Heart Disease Brother     Heart Attack Brother      at least 3 (first in his 45s)    Stroke Maternal Grandmother     No Known Problems Brother     No Known Problems Son     No Known Problems Son     No Known Problems Daughter     Asthma Paternal Grandmother     Cancer Paternal Uncle     Heart Disease Maternal Grandfather        Social History     Social History    Marital status:      Spouse name: N/A    Number of children: N/A    Years of education: N/A     Occupational History    Not on file. Social History Main Topics    Smoking status: Never Smoker    Smokeless tobacco: Never Used    Alcohol use No    Drug use: No    Sexual activity: Not Currently     Partners: Female     Other Topics Concern    Not on file     Social History Narrative       Current Outpatient Prescriptions on File Prior to Visit   Medication Sig Dispense Refill    ipratropium (ATROVENT) 0.03 % nasal spray 1-2 sprays three times a day as needed. 60 mL 3    polyvinyl alcohol (LIQUIFILM TEARS) 1.4 % ophthalmic solution Administer 1-2 Drops to both eyes daily. Indications: DRY EYE      ketoconazole (NIZORAL) 2 % topical cream Apply  to affected area two (2) times daily as needed. For jock itch rash. Dispense 90 day supply 90 g 3    aspirin delayed-release 81 mg tablet Take 81 mg by mouth every other day. No current facility-administered medications on file prior to visit. Review of Systems  Pertinent items are noted in HPI. Objective:     Visit Vitals    /79 (BP 1 Location: Left arm, BP Patient Position: Sitting)    Pulse 63    Temp 97.6 °F (36.4 °C) (Oral)    Resp 16    Ht 5' 10\" (1.778 m)    Wt 173 lb 12.8 oz (78.8 kg)    SpO2 96%    BMI 24.94 kg/m2     Gen: well appearing male  HEENT:   PERRL,normal conjunctiva. External ear and canals normal, TMs no opacification or erythema,  OP no erythema, no exudates, MMM  Neck:  Supple. Thyroid normal size, nontender, without nodules. No masses or LAD  Resp:  No wheezing, no rhonchi, no rales. CV:  RRR, normal S1S2, no murmur. GI: soft, nontender, without masses. No hepatosplenomegaly. Extrem:  +2 pulses, no edema, warm distally      Assessment/Plan:       ICD-10-CM ICD-9-CM    1. Medicare annual wellness visit, subsequent Z00.00 V70.0    2. Coronary artery disease due to lipid rich plaque I25.10 414.00     I25.83 414.3    3. Hypercholesterolemia E78.00 272.0    4. S/P CABG x 4 Z95.1 V45.81        Follow-up Disposition: Not on File    Ruthie Presley MD

## 2018-07-25 NOTE — PROGRESS NOTES
This is the Subsequent Medicare Annual Wellness Exam, performed 12 months or more after the Initial AWV or the last Subsequent AWV    I have reviewed the patient's medical history in detail and updated the computerized patient record. History     Past Medical History:   Diagnosis Date    Actinic keratosis, hx of     Actinic Keratoses Head, limbs    Allergic rhinitis     Benign prostatic hypertrophy     CAD (coronary artery disease)     anterior MI ;  by cath 2008    Hypercholesterolemia     Amando-Parkinson-White (WPW) syndrome     status post ablation      Past Surgical History:   Procedure Laterality Date    CABG, ARTERY-VEIN, FOUR  2006    x 4    CARDIAC SURG PROCEDURE UNLIST  ,     ENDOSCOPY, COLON, DIAGNOSTIC      neg; 10 years; Dr. Otilio Beasley HX COLONOSCOPY  14    Repeat in 5 years    HX HEENT      left eye surgery (Valerio Bevel on the eye)    HX ORTHOPAEDIC      meniscus repair (unknown which side)   Rob Patel Levo    TX COLON CA SCRN NOT  [address]  IND  2014    Treating by dermatologist     Current Outpatient Prescriptions   Medication Sig Dispense Refill    ipratropium (ATROVENT) 0.03 % nasal spray 1-2 sprays three times a day as needed. 60 mL 3    polyvinyl alcohol (LIQUIFILM TEARS) 1.4 % ophthalmic solution Administer 1-2 Drops to both eyes daily. Indications: DRY EYE      ketoconazole (NIZORAL) 2 % topical cream Apply  to affected area two (2) times daily as needed. For jock itch rash. Dispense 90 day supply 90 g 3    aspirin delayed-release 81 mg tablet Take 81 mg by mouth every other day.        Allergies   Allergen Reactions    Amoxicillin Hives    Atorvastatin Other (comments)     Felt bad; constipation; urine dark but no myalgia    Pravastatin Other (comments)     Difficulty raising right toe and issues walking    Simvastatin Myalgia     Family History   Problem Relation Age of Onset    Cancer Father 71     Brain    Other Father      Cholitis    Cancer Mother 80     Pancreatic CA    Heart Disease Brother     Heart Attack Brother      at least 3 (first in his 45s)    Stroke Maternal Grandmother     No Known Problems Brother     No Known Problems Son     No Known Problems Son     No Known Problems Daughter     Asthma Paternal Grandmother     Cancer Paternal Uncle     Heart Disease Maternal Grandfather      Social History   Substance Use Topics    Smoking status: Never Smoker    Smokeless tobacco: Never Used    Alcohol use No     Patient Active Problem List   Diagnosis Code    CAD (coronary artery disease) I25.10    Hypercholesterolemia E78.00    Benign prostatic hypertrophy N40.0    Allergic rhinitis J30.9    Musculoskeletal neck pain M54.2    S/P CABG x 4 Z95.1    Vasomotor rhinitis J30.0    Chronic right hip pain M25.551, G89.29    Actinic keratoses L57.0    Elevated blood pressure reading without diagnosis of hypertension R03.0    Benign colon polyp K63.5    Statin intolerance Z78.9    Primary insomnia F51.01    Recurrent depression ([facility] [address] F33.9       Depression Risk Factor Screening:     PHQ over the last two weeks 7/12/2017   Little interest or pleasure in doing things Not at all   Feeling down, depressed, irritable, or hopeless Not at all   Total Score PHQ 2 0     Alcohol Risk Factor Screening: You do not drink alcohol or very rarely. Functional Ability and Level of Safety:   Hearing Loss  Hearing is good. Activities of Daily Living  The home contains: no safety equipment. Patient does total self care    Fall Risk  Fall Risk Assessment, last 12 mths 7/25/2018   Able to walk? Yes   Fall in past 12 months?  No       Abuse Screen  Patient is not abused    Cognitive Screening   Evaluation of Cognitive Function:  Has your family/caregiver stated any concerns about your memory: no  Normal    Patient Care Team   Patient Care Team:  Bernadette Ford MD as PCP - General (Internal Medicine)  Sandip Thompson Sekou Dawson MD as Physician (Cardiology)  Cande Carlos MD as Physician (Gastroenterology)  Dr. Meghann Duarte (Optometry)  Daneil Osler, MD as Physician (Dermatology)  Teofilo Collet, MD as Physician (Ophthalmology)  Yamileth Urbano MD (Urology)    Assessment/Plan   Education and counseling provided:  Are appropriate based on today's review and evaluation    Diagnoses and all orders for this visit:    1. Coronary artery disease due to lipid rich plaque    2. Medicare annual wellness visit, subsequent    3. Hypercholesterolemia    4. S/P CABG x 4        There are no preventive care reminders to display for this patient.

## 2018-07-25 NOTE — MR AVS SNAPSHOT
[address] [facility] 
[phone] Patient: Aaron Ponce 
MRN:  [id number] Visit Information Date & Time Provider Department Dept. Phone Encounter #  
 7/25/2018 10:00 AM Lalita Marion, [address] [place] [address],[address] [phone] [id number] Follow-up Instructions Return in about 6 months (around 1/25/2019) for annual and fasting labs. .  
  
Your Appointments 1/15/2019  9:30 AM  
PHYSICAL with Lalita Marion MD  
[facility]) Appt Note: annual/ cpe  
 [address] [phone]  
[address] [facility] [address] [facility] Upcoming Health Maintenance Date Due Influenza Age 5 to Adult 8/1/2018 COLONOSCOPY 2/4/2019 MEDICARE YEARLY EXAM 7/26/2019 GLAUCOMA SCREENING Q2Y 12/14/2019 DTaP/Tdap/Td series (2 - Td) 10/13/2025 Allergies as of 7/25/2018  Review Complete On: 7/25/2018 By: Salud Serra LPN Severity Noted Reaction Type Reactions Amoxicillin  11/16/2009    Hives Atorvastatin  01/08/2014   Side Effect Other (comments) Felt bad; constipation; urine dark but no myalgia Pravastatin  11/17/2009    Other (comments) Difficulty raising right toe and issues walking Simvastatin  11/17/2009    Myalgia Current Immunizations  Reviewed on 7/12/2017 Name Date Influenza High Dose Vaccine PF 10/17/2017, 10/1/2014 Influenza Vaccine 10/18/2016, 10/13/2015, 9/26/2013 Influenza Vaccine Split 10/5/2011 Pneumococcal Conjugate (PCV-13) 10/13/2015 Pneumococcal Polysaccharide (PPSV-23) 1/1/2009 Pneumococcal Vaccine (Unspecified Type) 1/1/2009 Tdap 10/13/2015 Zoster Vaccine, Live 10/13/2015 Not reviewed this visit You Were Diagnosed With   
  
 Codes Comments Coronary artery disease due to lipid rich plaque    -  Primary ICD-10-CM: I25.10, I25.83 ICD-9-CM: 414.00, 414.3 Medicare annual wellness visit, subsequent     ICD-10-CM: Z00.00 ICD-9-CM: V70.0 Hypercholesterolemia     ICD-10-CM: E78.00 ICD-9-CM: 272.0 S/P CABG x 4     ICD-10-CM: Z95.1 ICD-9-CM: V45.81 Vitals BP Pulse Temp Resp Height(growth percentile) Weight(growth percentile) 123/79 (BP 1 Location: Left arm, BP Patient Position: Sitting) 63 97.6 °F (36.4 °C) (Oral) 16 5' 10\" (1.778 m) 173 lb 12.8 oz (78.8 kg) SpO2 BMI Smoking Status 96% 24.94 kg/m2 Never Smoker BMI and BSA Data Body Mass Index Body Surface Area 24.94 kg/m 2 1.97 m 2 Preferred Pharmacy Pharmacy Name Phone [facility] [address] AT [address] [place] [address] Drive [phone] Your Updated Medication List  
  
   
This list is accurate as of 7/25/18 11:12 AM.  Always use your most recent med list.  
  
  
  
  
 aspirin delayed-release 81 mg tablet Take 81 mg by mouth every other day. ipratropium 0.03 % nasal spray Commonly known as:  ATROVENT  
1-2 sprays three times a day as needed. ketoconazole 2 % topical cream  
Commonly known as:  NIZORAL Apply  to affected area two (2) times daily as needed. For jock itch rash. Dispense 90 day supply  
  
 polyvinyl alcohol 1.4 % ophthalmic solution Commonly known as:  Drew Fernandez Administer 1-2 Drops to both eyes daily. Indications: DRY EYE Follow-up Instructions Return in about 6 months (around 1/25/2019) for annual and fasting labs. .  
  
  
Patient Instructions Medicare Wellness Visit, Male The best way to live healthy is to have a lifestyle where you eat a well-balanced diet, exercise regularly, limit alcohol use, and quit all forms of tobacco/nicotine, if applicable. Regular preventive services are another way to keep healthy. Preventive services (vaccines, screening tests, monitoring & exams) can help personalize your care plan, which helps you manage your own care. Screening tests can find health problems at the earliest stages, when they are easiest to treat. [facility] follows the current, evidence-based guidelines published by the [facility] States Joel Polk ([facility]) when recommending preventive services for our patients. Because we follow these guidelines, sometimes recommendations change over time as research supports it. (For example, a prostate screening blood test is no longer routinely recommended for men with no symptoms.) Of course, you and your provider may decide to screen more often for some diseases, based on your risk and co-morbidities (chronic disease you are already diagnosed with). Preventive services for you include: - Medicare offers their members a free annual wellness visit, which is time for you and your primary care provider to discuss and plan for your preventive service needs. Take advantage of this benefit every year! 
 
-All people over age 72 should receive the recommended pneumonia vaccines. Current USPSTF guidelines recommend a series of two vaccines for the best pneumonia protection.  
 
-All adults should have a yearly flu vaccine and a tetanus vaccine every 10 years.  All adults age 61 years should receive a shingles vaccine once in their lifetime.   
 
-All adults age 38-68 years who are overweight should have a diabetes screening test once every three years.  
 
-Other screening tests & preventive services for persons with diabetes include: an eye exam to screen for diabetic retinopathy, a kidney function test, a foot exam, and stricter control over your cholesterol.  
 
-Cardiovascular screening for adults with routine risk involves an electrocardiogram (ECG) at intervals determined by the provider.  
 
-Colorectal cancer screenings should be done for adults age 54-65 years with normal risk. There are a number of acceptable methods of screening for this type of cancer. Each test has its own benefits and drawbacks. Discuss with your provider what is most appropriate for you during your annual wellness visit. The different tests include: colonoscopy (considered the best screening method), a fecal occult blood test, a fecal DNA test, and sigmoidoscopy. 
 
-All adults born between [facility] should be screened once for Hepatitis C. 
 
-An Abdominal Aortic Aneurysm (AAA) Screening is recommended for men age 73-68 who has ever smoked in their lifetime. Here is a list of your current Health Maintenance items (your personalized list of preventive services) with a due date: There are no preventive care reminders to display for this patient. Introducing [facility]! Dear Shelbie Murray: Thank you for requesting a [employer] account. Our records indicate that you already have an active Coraid account. You can access your account anytime at [email or web address] Did you know that you can access your hospital and ER discharge instructions at any time in Coraid? You can also review all of your test results from your hospital stay or ER visit. Additional Information If you have questions, please visit the Frequently Asked Questions section of the Coraid website at [email or web address] Remember, Coraid is NOT to be used for urgent needs. For medical emergencies, dial 911. Now available from your iPhone and Android! Please provide this summary of care documentation to your next provider. Your primary care clinician is listed as General Nayak. If you have any questions after today's visit, please call [phone].

## 2018-07-31 LAB — HEMOCCULT STL QL IA: NEGATIVE

## 2019-01-15 ENCOUNTER — OFFICE VISIT (OUTPATIENT)
Dept: INTERNAL MEDICINE CLINIC | Age: 79
End: 2019-01-15

## 2019-01-15 VITALS
OXYGEN SATURATION: 98 % | DIASTOLIC BLOOD PRESSURE: 70 MMHG | HEART RATE: 66 BPM | HEIGHT: 70 IN | TEMPERATURE: 97.5 F | WEIGHT: 175.8 LBS | RESPIRATION RATE: 16 BRPM | BODY MASS INDEX: 25.17 KG/M2 | SYSTOLIC BLOOD PRESSURE: 109 MMHG

## 2019-01-15 DIAGNOSIS — N40.0 BENIGN PROSTATIC HYPERPLASIA WITHOUT LOWER URINARY TRACT SYMPTOMS: ICD-10-CM

## 2019-01-15 DIAGNOSIS — D69.6 THROMBOCYTOPENIA (HCC): ICD-10-CM

## 2019-01-15 DIAGNOSIS — K63.5 POLYP OF COLON, UNSPECIFIED PART OF COLON, UNSPECIFIED TYPE: ICD-10-CM

## 2019-01-15 DIAGNOSIS — J30.0 VASOMOTOR RHINITIS: ICD-10-CM

## 2019-01-15 DIAGNOSIS — I25.83 CORONARY ARTERY DISEASE DUE TO LIPID RICH PLAQUE: Primary | ICD-10-CM

## 2019-01-15 DIAGNOSIS — I25.10 CORONARY ARTERY DISEASE DUE TO LIPID RICH PLAQUE: Primary | ICD-10-CM

## 2019-01-15 DIAGNOSIS — Z95.1 S/P CABG X 4: ICD-10-CM

## 2019-01-15 DIAGNOSIS — E78.00 HYPERCHOLESTEROLEMIA: ICD-10-CM

## 2019-01-15 LAB
APPEARANCE UR: CLEAR
BILIRUB UR QL STRIP: NEGATIVE
COLOR UR: YELLOW
GLUCOSE UR QL: NEGATIVE
HGB UR QL STRIP: NEGATIVE
KETONES UR QL STRIP: NEGATIVE
LEUKOCYTE ESTERASE UR QL STRIP: NEGATIVE
MICRO URNS: NORMAL
NITRITE UR QL STRIP: NEGATIVE
PH UR STRIP: 7.5 [PH] (ref 5–7.5)
PROT UR QL STRIP: NEGATIVE
SP GR UR: 1.02 (ref 1–1.03)
UROBILINOGEN UR STRIP-MCNC: 0.2 MG/DL (ref 0.2–1)

## 2019-01-15 RX ORDER — IPRATROPIUM BROMIDE 21 UG/1
SPRAY, METERED NASAL
Qty: 60 ML | Refills: 3 | Status: SHIPPED | OUTPATIENT
Start: 2019-01-15 | End: 2020-02-04

## 2019-01-15 NOTE — PROGRESS NOTES
Reviewed record in preparation for visit and have obtained necessary documentation. Identified pt with two pt identifiers(name and ). Chief Complaint Patient presents with  Annual Exam  
  Pt fasting  Medication Evaluation Health Maintenance Due Topic Date Due  Shingles Vaccine (1 of 2) 07/15/1991  Colonoscopy  2019 Mr. Ila Pickering has a reminder for a \"due or due soon\" health maintenance. I have asked that he discuss this further with his primary care provider for follow-up on this health maintenance. Coordination of Care Questionnaire: 
:  
 
1) Have you been to an emergency room, urgent care clinic since your last visit? no  
Hospitalized since your last visit? no          
 
2) Have you seen or consulted any other health care providers outside of [address] [place] [address] since your last visit? no  (Include any pap smears or colon screenings in this section.) 3) In the event something were to happen to you and you were unable to speak on your behalf, do you have an Advance Directive/ Living Will in place stating your wishes? NO Do you have an Advance Directive on file? no 
 
4) Are you interested in receiving information on Advance Directives? NO Patient is accompanied by self I have received verbal consent from Moses Oconnor to discuss any/all medical information while they are present in the room.

## 2019-01-15 NOTE — PROGRESS NOTES
Jenna Taylor is a 69 y.o. male who presents for follow up. Last seen in July and had medicare wellness review at that time. History of CAD with prior CABG . was having chest pain and had cath in  with patent grafts. He is statin intolerant. Patient was on aspirin 81mg daily. Stopped due to bruising and bleeding with shaving. Active regularly. Weight lifting. Treadmill 2-3 miles, 40 minutes. 3 days a week. No decline in exercise, in fact, he feels that exercise tolerance is better now. Healthy diet. No sx with exertion. Followed by cardiology, Dr. Chuyita Ricci, urology in the past for BPH. Due for PSA screening. Normal urination. Due for colon screening 2019, prior polyp. Dr. Light, GI.  normal BM.  scheduled . Sees derm annual.  Up to date on eye exam.   
 
 
Past Medical History:  
Diagnosis Date  Actinic keratosis, hx of   
 Actinic Keratoses Head, limbs  Allergic rhinitis  Benign prostatic hypertrophy  CAD (coronary artery disease)   
 anterior MI ;  by cath 2008  Hypercholesterolemia  Amando-Parkinson-White (WPW) syndrome   
 status post ablation Family History Problem Relation Age of Onset  Cancer Father 71 Brain  Other Father Cholitis  Cancer Mother 80 Pancreatic CA  Heart Disease Brother  Heart Attack Brother   
     at least 3 (first in his 45s)  Stroke Maternal Grandmother  No Known Problems Brother  No Known Problems Son  No Known Problems Son  No Known Problems Daughter  Asthma Paternal Grandmother  Cancer Paternal Uncle  Heart Disease Maternal Grandfather Social History Socioeconomic History  Marital status:  Spouse name: Not on file  Number of children: Not on file  Years of education: Not on file  Highest education level: Not on file Social Needs  Financial resource strain: Not on file  Food insecurity - worry: Not on file  Food insecurity - inability: Not on file  Transportation needs - medical: Not on file  Transportation needs - non-medical: Not on file Occupational History  Not on file Tobacco Use  Smoking status: Never Smoker  Smokeless tobacco: Never Used Substance and Sexual Activity  Alcohol use: No  
 Drug use: No  
 Sexual activity: Not Currently Partners: Female Other Topics Concern  Not on file Social History Narrative  Not on file Current Outpatient Medications on File Prior to Visit Medication Sig Dispense Refill  polyvinyl alcohol (LIQUIFILM TEARS) 1.4 % ophthalmic solution Administer 1-2 Drops to both eyes daily. Indications: DRY EYE  aspirin delayed-release 81 mg tablet Take 81 mg by mouth every other day.  ketoconazole (NIZORAL) 2 % topical cream Apply  to affected area two (2) times daily as needed. For jock itch rash. Dispense 90 day supply 90 g 3 No current facility-administered medications on file prior to visit. Review of Systems Pertinent items are noted in HPI. Objective:  
 
Visit Vitals /70 (BP 1 Location: Left arm, BP Patient Position: Sitting) Pulse 66 Temp 97.5 °F (36.4 °C) (Oral) Resp 16 Ht 5' 10\" (1.778 m) Wt 175 lb 12.8 oz (79.7 kg) SpO2 98% BMI 25.22 kg/m² Gen: well appearing male HEENT:   PERRL,normal conjunctiva. External ear and canals normal, TMs no opacification or erythema,  OP no erythema, no exudates, MMM Neck:  Supple. Thyroid normal size, nontender, without nodules. No masses or LAD Resp:  No wheezing, no rhonchi, no rales. CV:  RRR, normal S1S2, no murmur. GI: soft, nontender, without masses. No hepatosplenomegaly. Extrem:  +2 pulses, no edema, warm distally Assessment/Plan: ICD-10-CM ICD-9-CM 1.  Coronary artery disease due to lipid rich plaque N28.75 072.54 METABOLIC PANEL, COMPREHENSIVE  
 I25.83 414.3 CBC W/O DIFF  
 URINALYSIS W/ RFLX MICROSCOPIC 2. Benign prostatic hyperplasia without lower urinary tract symptoms N40.0 600.00 PSA W/ REFLX FREE PSA 3. Hypercholesterolemia H39.18 355.9 METABOLIC PANEL, COMPREHENSIVE  
   LIPID PANEL 4. S/P CABG x 4 Z95.1 V45.81   
5. Thrombocytopenia (HCC) D69.6 287.5 CBC W/O DIFF 6. Vasomotor rhinitis J30.0 477.9 ipratropium (ATROVENT) 0.03 % nasal spray 7. Polyp of colon, unspecified part of colon, unspecified type K63.5 211.3 REFERRAL TO GASTROENTEROLOGY  
resume aspirin  81mg three days a week. Follow-up Disposition: 
Return in about 6 months (around 7/15/2019) for follow up for medicare wellness/fasting labs. Jnea Gale MD 
 
Discussed the patient's BMI with him. The BMI follow up plan is as follows:  
 
dietary management education, guidance, and counseling 
encourage exercise 
monitor weight 
prescribed dietary intake An After Visit Summary was printed and given to the patient.

## 2019-01-15 NOTE — PATIENT INSTRUCTIONS
Resume aspirin 81mg three days a week. Body Mass Index: Care Instructions Your Care Instructions Body mass index (BMI) can help you see if your weight is raising your risk for health problems. It uses a formula to compare how much you weigh with how tall you are. · A BMI lower than 18.5 is considered underweight. · A BMI between 18.5 and 24.9 is considered healthy. · A BMI between 25 and 29.9 is considered overweight. A BMI of 30 or higher is considered obese. If your BMI is in the normal range, it means that you have a lower risk for weight-related health problems. If your BMI is in the overweight or obese range, you may be at increased risk for weight-related health problems, such as high blood pressure, heart disease, stroke, arthritis or joint pain, and diabetes. If your BMI is in the underweight range, you may be at increased risk for health problems such as fatigue, lower protection (immunity) against illness, muscle loss, bone loss, hair loss, and hormone problems. BMI is just one measure of your risk for weight-related health problems. You may be at higher risk for health problems if you are not active, you eat an unhealthy diet, or you drink too much alcohol or use tobacco products. Follow-up care is a key part of your treatment and safety. Be sure to make and go to all appointments, and call your doctor if you are having problems. It's also a good idea to know your test results and keep a list of the medicines you take. How can you care for yourself at home? · Practice healthy eating habits. This includes eating plenty of fruits, vegetables, whole grains, lean protein, and low-fat dairy. · If your doctor recommends it, get more exercise. Walking is a good choice. Bit by bit, increase the amount you walk every day. Try for at least 30 minutes on most days of the week. · Do not smoke. Smoking can increase your risk for health problems.  If you need help quitting, talk to your doctor about stop-smoking programs and medicines. These can increase your chances of quitting for good. · Limit alcohol to 2 drinks a day for men and 1 drink a day for women. Too much alcohol can cause health problems. If you have a BMI higher than 25 · Your doctor may do other tests to check your risk for weight-related health problems. This may include measuring the distance around your waist. A waist measurement of more than 40 inches in men or 35 inches in women can increase the risk of weight-related health problems. · Talk with your doctor about steps you can take to stay healthy or improve your health. You may need to make lifestyle changes to lose weight and stay healthy, such as changing your diet and getting regular exercise. If you have a BMI lower than 18.5 · Your doctor may do other tests to check your risk for health problems. · Talk with your doctor about steps you can take to stay healthy or improve your health. You may need to make lifestyle changes to gain or maintain weight and stay healthy, such as getting more healthy foods in your diet and doing exercises to build muscle. Where can you learn more? Go to http://miranda-wilfredo.info/. Enter S176 in the search box to learn more about \"Body Mass Index: Care Instructions. \" Current as of: October 13, 2016 Content Version: 11.4 © 2349-1761 [employer]wise, [employer]orporated. Care instructions adapted under license by [employer] (which disclaims liability or warranty for this information). If you have questions about a medical condition or this instruction, always ask your healthcare professional. [place] [address] any warranty or liability for your use of this information.

## 2019-01-16 ENCOUNTER — TELEPHONE (OUTPATIENT)
Dept: INTERNAL MEDICINE CLINIC | Age: 79
End: 2019-01-16

## 2019-01-16 PROBLEM — F33.9 RECURRENT DEPRESSION (HCC): Status: RESOLVED | Noted: 2018-01-18 | Resolved: 2019-01-16

## 2019-01-16 LAB
ALBUMIN SERPL-MCNC: 4.5 G/DL (ref 3.5–4.8)
ALBUMIN/GLOB SERPL: 2 {RATIO} (ref 1.2–2.2)
ALP SERPL-CCNC: 111 IU/L (ref 39–117)
ALT SERPL-CCNC: 17 IU/L (ref 0–44)
AST SERPL-CCNC: 22 IU/L (ref 0–40)
BILIRUB SERPL-MCNC: 0.8 MG/DL (ref 0–1.2)
BUN SERPL-MCNC: 13 MG/DL (ref 8–27)
BUN/CREAT SERPL: 13 (ref 10–24)
CALCIUM SERPL-MCNC: 9 MG/DL (ref 8.6–10.2)
CHLORIDE SERPL-SCNC: 107 MMOL/L (ref 96–106)
CHOLEST SERPL-MCNC: 138 MG/DL (ref 100–199)
CO2 SERPL-SCNC: 24 MMOL/L (ref 20–29)
CREAT SERPL-MCNC: 1.03 MG/DL (ref 0.76–1.27)
ERYTHROCYTE [DISTWIDTH] IN BLOOD BY AUTOMATED COUNT: 14.2 % (ref 12.3–15.4)
GLOBULIN SER CALC-MCNC: 2.3 G/DL (ref 1.5–4.5)
GLUCOSE SERPL-MCNC: 88 MG/DL (ref 65–99)
HCT VFR BLD AUTO: 43.8 % (ref 37.5–51)
HDLC SERPL-MCNC: 35 MG/DL
HGB BLD-MCNC: 14.8 G/DL (ref 13–17.7)
LDLC SERPL CALC-MCNC: 90 MG/DL (ref 0–99)
MCH RBC QN AUTO: 29.8 PG (ref 26.6–33)
MCHC RBC AUTO-ENTMCNC: 33.8 G/DL (ref 31.5–35.7)
MCV RBC AUTO: 88 FL (ref 79–97)
PLATELET # BLD AUTO: 134 X10E3/UL (ref 150–379)
POTASSIUM SERPL-SCNC: 4.9 MMOL/L (ref 3.5–5.2)
PROT SERPL-MCNC: 6.8 G/DL (ref 6–8.5)
PSA SERPL-MCNC: 1.9 NG/ML (ref 0–4)
RBC # BLD AUTO: 4.96 X10E6/UL (ref 4.14–5.8)
REFLEX CRITERIA: NORMAL
SODIUM SERPL-SCNC: 145 MMOL/L (ref 134–144)
TRIGL SERPL-MCNC: 64 MG/DL (ref 0–149)
VLDLC SERPL CALC-MCNC: 13 MG/DL (ref 5–40)
WBC # BLD AUTO: 4.5 X10E3/UL (ref 3.4–10.8)

## 2019-01-16 NOTE — TELEPHONE ENCOUNTER
Regarding: Update Medical Information  Contact: [phone]  ----- Message from [address] [place] [address] [address], Generic sent at 1/15/2019  1:54 PM EST -----    I have asked three times at least that 'Recurrent Depression ([address]' be removed from my list of issues. I do not know how this got placed in my diagnoses. I do not now have, nor have I ever had, any issues with depression. I suspect that Dr. Mio Henning put this in during an Annual Wellness Visit question and answer session, as Dr. Benson Timmons and I have never discussed this topic. I am quite serious about getting this off my record(s) anywhere in your data base(s). Thanks to Dr. Benson Timmons, my PCP, for the wonderful care and interest she shows in my health. Both my wife and I think she is the best physician we have ever seen!

## 2019-01-21 ENCOUNTER — TELEPHONE (OUTPATIENT)
Dept: INTERNAL MEDICINE CLINIC | Age: 79
End: 2019-01-21

## 2019-01-21 NOTE — TELEPHONE ENCOUNTER
Regarding: RE:lab results  Contact: [phone]  ----- Message from [address] [place] [address] [address], Generic sent at 1/21/2019 10:30 AM EST -----    What about my low platelets (175) again? Low end of the range is 150! And I had not been taking baby aspirin at the time for several weeks! The only other things I saw out of range was sodium and chloride - both were 1 point above their high range. Probably because of my wife cooking a very salty roast and gravy 2 nights before! I am concerned about the platelets!    ----- Message -----  From: Jose Alfredo Gomez LPN  Sent: 2/97/2898  8:18 AM EST  To: Jackie Zepeda  Subject: lab results  Good Morning,  This message is to inform you that your recent lab results showed:  labs are normal.  Cholesterol controlled. Follow up 6 months and as needed   Please contact the office if you have any questions. Thanks,     Leanna Car. Claudeen Stabile, [address]   Please note- My Chart is for non-urgent health concerns. You can expect a reply from our office within 2 business days. You should not email your provider about emergent health issues. If you have an emergency, please call 911. If you have an urgent concern, please call our office at [phone].

## 2019-01-22 NOTE — TELEPHONE ENCOUNTER
MD Litzy Kohler LPN   Caller: Unspecified Roxi Murrell, 12:23 PM)             Advise patient his platelet count is very mildly reduced and has varied from 127,000 to 152,000 since 2016.  The aspirin would not affect the platelet count. It only affects platelet function to reduce risk of clotting. As we discussed aspirin 81mg three days a week for heart protection is advisable.  I am not concerned about the platelet count.  He can go see a hematologist about the platelet count if desired.  The other tests are normal.      Responded to patients [employer] message with the above information from Dr. Aline Rasheed.

## 2019-02-01 ENCOUNTER — HOSPITAL ENCOUNTER (OUTPATIENT)
Age: 79
Setting detail: OUTPATIENT SURGERY
Discharge: HOME | End: 2019-02-01
Attending: INTERNAL MEDICINE | Admitting: INTERNAL MEDICINE
Payer: MEDICARE

## 2019-02-01 ENCOUNTER — ANESTHESIA EVENT (OUTPATIENT)
Dept: ENDOSCOPY | Age: 79
End: 2019-02-01
Payer: MEDICARE

## 2019-02-01 ENCOUNTER — ANESTHESIA (OUTPATIENT)
Dept: ENDOSCOPY | Age: 79
End: 2019-02-01
Payer: MEDICARE

## 2019-02-01 VITALS
HEIGHT: 70 IN | SYSTOLIC BLOOD PRESSURE: 126 MMHG | TEMPERATURE: 97.5 F | WEIGHT: 176 LBS | RESPIRATION RATE: 29 BRPM | BODY MASS INDEX: 25.2 KG/M2 | HEART RATE: 67 BPM | DIASTOLIC BLOOD PRESSURE: 78 MMHG | OXYGEN SATURATION: 98 %

## 2019-02-01 PROCEDURE — 76040000019: Performed by: INTERNAL MEDICINE

## 2019-02-01 PROCEDURE — 74011250636 HC RX REV CODE- 250/636: Performed by: INTERNAL MEDICINE

## 2019-02-01 PROCEDURE — 76060000031 HC ANESTHESIA FIRST 0.5 HR: Performed by: INTERNAL MEDICINE

## 2019-02-01 PROCEDURE — 74011250636 HC RX REV CODE- 250/636

## 2019-02-01 PROCEDURE — 74011250637 HC RX REV CODE- 250/637: Performed by: INTERNAL MEDICINE

## 2019-02-01 RX ORDER — SODIUM CHLORIDE 0.9 % (FLUSH) 0.9 %
5-40 SYRINGE (ML) INJECTION AS NEEDED
Status: DISCONTINUED | OUTPATIENT
Start: 2019-02-01 | End: 2019-02-01 | Stop reason: HOSPADM

## 2019-02-01 RX ORDER — SODIUM CHLORIDE 9 MG/ML
100 INJECTION, SOLUTION INTRAVENOUS CONTINUOUS
Status: DISCONTINUED | OUTPATIENT
Start: 2019-02-01 | End: 2019-02-01 | Stop reason: HOSPADM

## 2019-02-01 RX ORDER — SODIUM CHLORIDE 0.9 % (FLUSH) 0.9 %
5-40 SYRINGE (ML) INJECTION EVERY 8 HOURS
Status: DISCONTINUED | OUTPATIENT
Start: 2019-02-01 | End: 2019-02-01 | Stop reason: HOSPADM

## 2019-02-01 RX ORDER — ATROPINE SULFATE 0.1 MG/ML
0.5 INJECTION INTRAVENOUS
Status: DISCONTINUED | OUTPATIENT
Start: 2019-02-01 | End: 2019-02-01 | Stop reason: HOSPADM

## 2019-02-01 RX ORDER — PROPOFOL 10 MG/ML
INJECTION, EMULSION INTRAVENOUS AS NEEDED
Status: DISCONTINUED | OUTPATIENT
Start: 2019-02-01 | End: 2019-02-01 | Stop reason: HOSPADM

## 2019-02-01 RX ORDER — DEXTROMETHORPHAN/PSEUDOEPHED 2.5-7.5/.8
1.2 DROPS ORAL
Status: DISCONTINUED | OUTPATIENT
Start: 2019-02-01 | End: 2019-02-01 | Stop reason: HOSPADM

## 2019-02-01 RX ORDER — LIDOCAINE HYDROCHLORIDE 20 MG/ML
INJECTION, SOLUTION EPIDURAL; INFILTRATION; INTRACAUDAL; PERINEURAL AS NEEDED
Status: DISCONTINUED | OUTPATIENT
Start: 2019-02-01 | End: 2019-02-01 | Stop reason: HOSPADM

## 2019-02-01 RX ORDER — EPINEPHRINE 0.1 MG/ML
1 INJECTION INTRACARDIAC; INTRAVENOUS
Status: DISCONTINUED | OUTPATIENT
Start: 2019-02-01 | End: 2019-02-01 | Stop reason: HOSPADM

## 2019-02-01 RX ORDER — FLUMAZENIL 0.1 MG/ML
0.2 INJECTION INTRAVENOUS
Status: DISCONTINUED | OUTPATIENT
Start: 2019-02-01 | End: 2019-02-01 | Stop reason: HOSPADM

## 2019-02-01 RX ADMIN — SIMETHICONE 80 MG: 20 SUSPENSION/ DROPS ORAL at 08:52

## 2019-02-01 RX ADMIN — PROPOFOL 50 MG: 10 INJECTION, EMULSION INTRAVENOUS at 08:51

## 2019-02-01 RX ADMIN — LIDOCAINE HYDROCHLORIDE 50 MG: 20 INJECTION, SOLUTION EPIDURAL; INFILTRATION; INTRACAUDAL; PERINEURAL at 08:42

## 2019-02-01 RX ADMIN — PROPOFOL 50 MG: 10 INJECTION, EMULSION INTRAVENOUS at 08:49

## 2019-02-01 RX ADMIN — SODIUM CHLORIDE 100 ML/HR: 900 INJECTION, SOLUTION INTRAVENOUS at 08:05

## 2019-02-01 RX ADMIN — PROPOFOL 50 MG: 10 INJECTION, EMULSION INTRAVENOUS at 08:59

## 2019-02-01 RX ADMIN — PROPOFOL 50 MG: 10 INJECTION, EMULSION INTRAVENOUS at 08:57

## 2019-02-01 RX ADMIN — PROPOFOL 50 MG: 10 INJECTION, EMULSION INTRAVENOUS at 08:45

## 2019-02-01 RX ADMIN — PROPOFOL 50 MG: 10 INJECTION, EMULSION INTRAVENOUS at 08:42

## 2019-02-01 RX ADMIN — PROPOFOL 50 MG: 10 INJECTION, EMULSION INTRAVENOUS at 08:54

## 2019-02-01 NOTE — PROCEDURES
[facility]                  Colonoscopy Operative Report    2/1/2019      David Crocker  [id number]  1940    Procedure Type:   Colonoscopy --screening     Indications:    Personal history of colon polyps (screening only)     Pre-operative Diagnosis: see indication above    Post-operative Diagnosis:  See findings below    :  Norman Prajapati MD    Referring Provider: Caesar Arce MD      Sedation:  MAC anesthesia Propofol    Pre-Procedural Exam:      Airway: clear,  No airway problems anticipated  Heart: RRR, without gallops or rubs  Lungs: clear bilaterally without wheezes, crackles, or rhonchi  Abdomen: soft, nontender, nondistended, bowel sounds present  Mental Status: awake, alert and oriented to person, place and time     Procedure Details:  After informed consent was obtained with all risks and benefits of procedure explained and preoperative exam completed, the patient was taken to the endoscopy suite and placed in the left lateral decubitus position. Upon sequential sedation as per above, a digital rectal exam was performed . The Olympus videocolonoscope  was inserted in the rectum and carefully advanced to the cecum, which was identified by the ileocecal valve and appendiceal orifice. The cecum was identified by the ileocecal valve and appendiceal orifice. The quality of preparation was adequate. The colonoscope was slowly withdrawn with careful evaluation between folds. Retroflexion in the rectum was completed demonstrating internal hemorrhoids. Findings:   Rectum: Grade 1 internal hemorrhoid(s); Sigmoid: no mucosal lesion appreciated  Descending Colon: no mucosal lesion appreciated  Transverse Colon: no mucosal lesion appreciated  Ascending Colon: no mucosal lesion appreciated  Cecum: no mucosal lesion appreciated  Terminal Ileum: not intubated      Specimen Removed:  none    Complications: None. EBL:  None.     Impression:    normal colonic mucosa throughout  hemorrhoids internal, Small in size    Recommendations: --No repeat screening colonoscopy indicated due to age. High fiber diet. Resume normal medication(s). Discharge Disposition:  Home in the company of a  when able to ambulate. Amanda Miranda MD    2/1/2019     AMBREEN Garcia MD  [facility], [address] [facility]  [address]  [phone]  [email or web address]

## 2019-02-01 NOTE — H&P
Jose De Jesus Veras MD 
[facility], [address] [place] [address], [address] 
[phone] 
[email or web address] [facility] Outpatient History and Physical 
 
Patient: Maribel Darnell 
 
Physician: Miracle Jaocb MD 
 
Vital Signs: Blood pressure 134/76, pulse 71, temperature 97.8 °F (36.6 °C), resp. rate 16, height 5' 10\" (1.778 m), weight 79.8 kg (176 lb), SpO2 99 %. Allergies: Allergies Allergen Reactions  Amoxicillin Hives  Atorvastatin Other (comments) Felt bad; constipation; urine dark but no myalgia  Pravastatin Other (comments) Difficulty raising right toe and issues walking  Simvastatin Myalgia Chief Complaint: Hx of polyps History of Present Illness: Personal history of colonic polyps. Last colonoscopy was 2014 and showed small polyp which was removed. Currently has no GI symptoms. No FH of colon cancer or polyps. History: 
Past Medical History:  
Diagnosis Date  Actinic keratosis, hx of   
 Actinic Keratoses Head, limbs  Allergic rhinitis  Benign prostatic hypertrophy  CAD (coronary artery disease)   
 anterior MI ;  by cath 2008  Hypercholesterolemia  Amando-Parkinson-White (WPW) syndrome 1973  
 status post ablation Past Surgical History:  
Procedure Laterality Date  CABG, ARTERY-VEIN, FOUR  2006  
 x 4  
 CARDIAC SURG PROCEDURE UNLIST  ,   ENDOSCOPY, COLON, DIAGNOSTIC    
 neg; 10 years; Dr. Wen Smith  HX COLONOSCOPY  14 Repeat in 5 years  HX HEENT Left   
 left eye surgery (Wing Danker on the eye)  HX ORTHOPAEDIC    
 meniscus repair (unknown which side) [address] [facility] [address]. Marily Medrano  MT COLON CA SCRN NOT HI RSK IND  2014 Treating by dermatologist  
  
Social History Socioeconomic History  Marital status:  Spouse name: Not on file  Number of children: Not on file  Years of education: Not on file  Highest education level: Not on file Tobacco Use  Smoking status: Never Smoker  Smokeless tobacco: Never Used Substance and Sexual Activity  Alcohol use: No  
 Drug use: No  
 Sexual activity: Not Currently Partners: Female Family History Problem Relation Age of Onset  Cancer Father 71 Brain  Other Father Cholitis  Cancer Mother 80 Pancreatic CA  Heart Disease Brother  Heart Attack Brother   
     at least 3 (first in his 45s)  Stroke Maternal Grandmother  No Known Problems Brother  No Known Problems Son  No Known Problems Son  No Known Problems Daughter  Asthma Paternal Grandmother  Cancer Paternal Uncle  Heart Disease Maternal Grandfather Patient Active Problem List  
Diagnosis Code  CAD (coronary artery disease) I25.10  Hypercholesterolemia E78.00  Benign prostatic hyperplasia without lower urinary tract symptoms N40.0  Allergic rhinitis J30.9  Musculoskeletal neck pain M54.2  
 S/P CABG x 4 Z95.1  Vasomotor rhinitis J30.0  Chronic right hip pain M25.551, G89.29  
 Actinic keratoses L57.0  Elevated blood pressure reading without diagnosis of hypertension R03.0  Benign colon polyp K63.5  Statin intolerance Z78.9  Primary insomnia F51.01 Medications:  
Prior to Admission medications Medication Sig Start Date End Date Taking? Authorizing Provider  
ipratropium (ATROVENT) 0.03 % nasal spray 1-2 sprays three times a day as needed. 1/15/19  Yes Angella Mejía MD  
polyvinyl alcohol (LIQUIFILM TEARS) 1.4 % ophthalmic solution Administer 1-2 Drops to both eyes as needed. Yes Provider, Historical  
ketoconazole (NIZORAL) 2 % topical cream Apply  to affected area two (2) times daily as needed. For jock itch rash.  Dispense 90 day supply 1/14/15  Yes Alia Pendleton MD  
 aspirin delayed-release 81 mg tablet Take 81 mg by mouth every other day. Yes Provider, Historical  
 
 
Physical Exam:  
 
General: well developed, well nourished HEENT: unremarkable Heart: regular rhythm no mumur Lungs: clear Abdominal:  benign Neurological: unremarkable Extremities: no edema Findings/Diagnosis: Personal history of colonic polyps Plan of Care/Planned Procedure: Colonoscopy with monitored anesthesia care sedation Signed: 
Jamia Griffin MD 2/1/2019

## 2019-02-01 NOTE — DISCHARGE INSTRUCTIONS
Marla Blackburn MD  [facility], [address]  [place], [address] [facility]  [phone]  [email or web address]    Masah Paz  [id number]  1940    COLON DISCHARGE INSTRUCTIONS  Discomfort:  Redness at IV site- apply warm compress to area; if redness or soreness persist- contact your physician  There may be a slight amount of blood passed from the rectum  Gaseous discomfort- walking, belching will help relieve any discomfort  You may not operate a vehicle for 12 hours  You may not engage in an occupation involving machinery or appliances for rest of today  You may not drink alcoholic beverages for at least 12 hours  Avoid making any critical decisions for at least 24 hour  DIET:   High fiber diet. - however -  remember your colon is empty and a heavy meal will produce gas. Avoid these foods:  vegetables, fried / greasy foods, carbonated drinks for today      ACTIVITY:  You may resume your normal daily activities it is recommended that you spend the remainder of the day resting -  avoid any strenuous activity. CALL M.D. ANY SIGN OF:   Increasing pain, nausea, vomiting  Abdominal distension (swelling)  New increased bleeding (oral or rectal)  Fever (chills)     COLONOSCOPY FINDINGS:  Your colonoscopy showed: just some internal hemorrhoids. NO polyps found. Follow-up Instructions:   Call Dr. Marla Blackburn if any questions or problems.    Telephone # [phone]

## 2019-02-01 NOTE — ANESTHESIA POSTPROCEDURE EVALUATION
Procedure(s): 
COLONOSCOPY. Anesthesia Post Evaluation Patient location during evaluation: PACU Note status: Adequate. Level of consciousness: responsive to verbal stimuli and sleepy but conscious Pain management: satisfactory to patient Airway patency: patent Anesthetic complications: no 
Cardiovascular status: acceptable Respiratory status: acceptable Hydration status: acceptable Comments: +Post-Anesthesia Evaluation and Assessment Patient: Viviane Woodall MRN: [id number]  SSN: [id number] YOB: 1940  Age: 69 y.o. Sex: male Cardiovascular Function/Vital Signs BP 95/72   Pulse 69   Temp 36.4 °C (97.5 °F)   Resp 18   Ht 5' 10\" (1.778 m)   Wt 79.8 kg (176 lb)   SpO2 97%   BMI 25.25 kg/m² Patient is status post Procedure(s): 
COLONOSCOPY. Nausea/Vomiting: Controlled. Postoperative hydration reviewed and adequate. Pain: 
Pain Scale 1: Numeric (0 - 10) (02/01/19 0914) Pain Intensity 1: 0 (02/01/19 0914) Managed. Neurological Status: At baseline. Mental Status and Level of Consciousness: Arousable. Pulmonary Status:  
O2 Device: Room air (02/01/19 0919) Adequate oxygenation and airway patent. Complications related to anesthesia: None Post-anesthesia assessment completed. No concerns. Signed By: Kailyn Medellin MD  
 2/1/2019 Post anesthesia nausea and vomiting:  controlled Visit Vitals BP 95/72 Pulse 69 Temp 36.4 °C (97.5 °F) Resp 18 Ht 5' 10\" (1.778 m) Wt 79.8 kg (176 lb) SpO2 97% BMI 25.25 kg/m²

## 2019-02-01 NOTE — PROGRESS NOTES
Rojelio Cynthia 
1940 
[id number] Situation: 
Verbal report received from: Benito Aguilar Procedure: Procedure(s): 
COLONOSCOPY Background: 
 
Preoperative diagnosis: HISTORY OF POLYPS Postoperative diagnosis: Internal Hemorrhoids :  Dr. Rajeev Chaidez Assistant(s): Endoscopy Technician-1: Shawn Croft Endoscopy RN-1: Samia Rowland Specimens: * No specimens in log * H. Pylori  no Assessment: 
Intra-procedure medications  
e anesthesia flow sheet yes Intravenous fluids: NS@ Cheryal Helm Vital signs stable Abdominal assessment: round and soft Recommendation: 
Discharge patient per MD order. Family or Friend Permission to share finding with family or friend yes

## 2019-02-01 NOTE — PERIOP NOTES
Anesthesia reports 350mg Propofol, 50mg Lidocaine and 350mL NS given during procedure. Received report from anesthesia staff on vital signs and status of patient.

## 2019-04-10 ENCOUNTER — PATIENT MESSAGE (OUTPATIENT)
Dept: INTERNAL MEDICINE CLINIC | Age: 79
End: 2019-04-10

## 2019-04-11 RX ORDER — CLOTRIMAZOLE AND BETAMETHASONE DIPROPIONATE 10; .64 MG/G; MG/G
CREAM TOPICAL 2 TIMES DAILY
Qty: 45 G | Refills: 3 | Status: SHIPPED | OUTPATIENT
Start: 2019-04-11 | End: 2020-02-04 | Stop reason: SDUPTHER

## 2019-04-12 NOTE — TELEPHONE ENCOUNTER
Regarding: FW: Prescription Question  Contact: [phone]      ----- Message -----  From: Santa Webb  Sent: 4/10/2019   2:14 PM  To: [facility] Nurse Pool  Subject: Prescription Question                            ----- Message from [address] [place] [address] [address], Generic sent at 4/10/2019  2:14 PM EDT -----    I have a recurring 'jock itch' problem. The only mediation that has consistently worked for me is clotrimazole-betamethasone (1%/0.05%). I have checked with [employer] and can get a 45 gram tube of cream for $17.60. It is the same price at [employer], [address]  using my [employer] drug plan. The lotion version is way too expensive. Would you send over a prescription to [facility] for the 45 gram tube for me? Please make it 'as-needed' with 2 refills. I can make an appointment with you, if necessary. Thanks!

## 2019-07-18 ENCOUNTER — TELEPHONE (OUTPATIENT)
Dept: INTERNAL MEDICINE CLINIC | Age: 79
End: 2019-07-18

## 2019-07-18 ENCOUNTER — OFFICE VISIT (OUTPATIENT)
Dept: INTERNAL MEDICINE CLINIC | Age: 79
End: 2019-07-18

## 2019-07-18 VITALS
TEMPERATURE: 97.5 F | WEIGHT: 177 LBS | HEART RATE: 62 BPM | DIASTOLIC BLOOD PRESSURE: 70 MMHG | HEIGHT: 70 IN | BODY MASS INDEX: 25.34 KG/M2 | SYSTOLIC BLOOD PRESSURE: 104 MMHG | RESPIRATION RATE: 16 BRPM | OXYGEN SATURATION: 96 %

## 2019-07-18 DIAGNOSIS — I25.10 CORONARY ARTERY DISEASE DUE TO LIPID RICH PLAQUE: Primary | ICD-10-CM

## 2019-07-18 DIAGNOSIS — Z13.820 SCREENING FOR OSTEOPOROSIS: ICD-10-CM

## 2019-07-18 DIAGNOSIS — N40.0 BENIGN PROSTATIC HYPERPLASIA WITHOUT LOWER URINARY TRACT SYMPTOMS: ICD-10-CM

## 2019-07-18 DIAGNOSIS — I25.83 CORONARY ARTERY DISEASE DUE TO LIPID RICH PLAQUE: Primary | ICD-10-CM

## 2019-07-18 DIAGNOSIS — Z13.31 SCREENING FOR DEPRESSION: ICD-10-CM

## 2019-07-18 DIAGNOSIS — Z00.00 MEDICARE ANNUAL WELLNESS VISIT, SUBSEQUENT: ICD-10-CM

## 2019-07-18 NOTE — PATIENT INSTRUCTIONS
Office Policies    Phone calls/patient messages:            Please allow up to 24 hours for someone in the office to contact you about your call or message. Be mindful your provider may be out of the office or your message may require further review. We encourage you to use [employer] for your messages as this is a faster, more efficient way to communicate with our office                         Medication Refills:            Prescription medications require 48-72 business hours to process. We encourage you to use [employer] for your refills. For controlled medications: Please allow 72 business hours to process. Certain medications may require you to  a written prescription at our office. NO narcotic/controlled medications will be prescribed after 4pm Monday through Friday or on weekends              Form/Paperwork Completion:            Please note a $25 fee may incur for all paperwork for completed by our providers. We ask that you allow 7-10 business days. Pre-payment is due prior to picking up/faxing the completed form. You may also download your forms to [employer] to have your doctor print off. Medicare Wellness Visit, Male    The best way to live healthy is to have a lifestyle where you eat a well-balanced diet, exercise regularly, limit alcohol use, and quit all forms of tobacco/nicotine, if applicable. Regular preventive services are another way to keep healthy. Preventive services (vaccines, screening tests, monitoring & exams) can help personalize your care plan, which helps you manage your own care. Screening tests can find health problems at the earliest stages, when they are easiest to treat. [facility] follows the current, evidence-based guidelines published by the [facility] [place] (USPSTF) when recommending preventive services for our patients.  Because we follow these guidelines, sometimes recommendations change over time as research supports it. (For example, a prostate screening blood test is no longer routinely recommended for men with no symptoms.)  Of course, you and your doctor may decide to screen more often for some diseases, based on your risk and co-morbidities (chronic disease you are already diagnosed with). Preventive services for you include:  - Medicare offers their members a free annual wellness visit, which is time for you and your primary care provider to discuss and plan for your preventive service needs. Take advantage of this benefit every year!  -All adults over age 72 should receive the recommended pneumonia vaccines. Current USPSTF guidelines recommend a series of two vaccines for the best pneumonia protection.   -All adults should have a flu vaccine yearly and an ECG. All adults age 61 and older should receive a shingles vaccine once in their lifetime.    -All adults age 38-68 who are overweight should have a diabetes screening test once every three years.   -Other screening tests & preventive services for persons with diabetes include: an eye exam to screen for diabetic retinopathy, a kidney function test, a foot exam, and stricter control over your cholesterol.   -Cardiovascular screening for adults with routine risk involves an electrocardiogram (ECG) at intervals determined by the provider.   -Colorectal cancer screening should be done for adults age 54-65 with no increased risk factors for colorectal cancer. There are a number of acceptable methods of screening for this type of cancer. Each test has its own benefits and drawbacks. Discuss with your provider what is most appropriate for you during your annual wellness visit.  The different tests include: colonoscopy (considered the best screening method), a fecal occult blood test, a fecal DNA test, and sigmoidoscopy.  -All adults born between [facility] should be screened once for Hepatitis C.  -An Abdominal Aortic Aneurysm (AAA) Screening is recommended for men age 73-68 who has ever smoked in their lifetime. Here is a list of your current Health Maintenance items (your personalized list of preventive services) with a due date:  Health Maintenance Due   Topic Date Due    Shingles Vaccine (1 of 2) 07/15/1991    Annual Well Visit  07/26/2019          Body Mass Index: Care Instructions  Your Care Instructions    Body mass index (BMI) can help you see if your weight is raising your risk for health problems. It uses a formula to compare how much you weigh with how tall you are. · A BMI lower than 18.5 is considered underweight. · A BMI between 18.5 and 24.9 is considered healthy. · A BMI between 25 and 29.9 is considered overweight. A BMI of 30 or higher is considered obese. If your BMI is in the normal range, it means that you have a lower risk for weight-related health problems. If your BMI is in the overweight or obese range, you may be at increased risk for weight-related health problems, such as high blood pressure, heart disease, stroke, arthritis or joint pain, and diabetes. If your BMI is in the underweight range, you may be at increased risk for health problems such as fatigue, lower protection (immunity) against illness, muscle loss, bone loss, hair loss, and hormone problems. BMI is just one measure of your risk for weight-related health problems. You may be at higher risk for health problems if you are not active, you eat an unhealthy diet, or you drink too much alcohol or use tobacco products. Follow-up care is a key part of your treatment and safety. Be sure to make and go to all appointments, and call your doctor if you are having problems. It's also a good idea to know your test results and keep a list of the medicines you take. How can you care for yourself at home? · Practice healthy eating habits. This includes eating plenty of fruits, vegetables, whole grains, lean protein, and low-fat dairy.   · If your doctor recommends it, get more exercise. Walking is a good choice. Bit by bit, increase the amount you walk every day. Try for at least 30 minutes on most days of the week. · Do not smoke. Smoking can increase your risk for health problems. If you need help quitting, talk to your doctor about stop-smoking programs and medicines. These can increase your chances of quitting for good. · Limit alcohol to 2 drinks a day for men and 1 drink a day for women. Too much alcohol can cause health problems. If you have a BMI higher than 25  · Your doctor may do other tests to check your risk for weight-related health problems. This may include measuring the distance around your waist. A waist measurement of more than 40 inches in men or 35 inches in women can increase the risk of weight-related health problems. · Talk with your doctor about steps you can take to stay healthy or improve your health. You may need to make lifestyle changes to lose weight and stay healthy, such as changing your diet and getting regular exercise. If you have a BMI lower than 18.5  · Your doctor may do other tests to check your risk for health problems. · Talk with your doctor about steps you can take to stay healthy or improve your health. You may need to make lifestyle changes to gain or maintain weight and stay healthy, such as getting more healthy foods in your diet and doing exercises to build muscle. Where can you learn more? Go to http://miranda-wilfredo.info/. Enter S176 in the search box to learn more about \"Body Mass Index: Care Instructions. \"  Current as of: October 13, 2016  Content Version: 11.4  © 6699-9416 [employer]. Care instructions adapted under license by [employer] (which disclaims liability or warranty for this information).  If you have questions about a medical condition or this instruction, always ask your healthcare professional. [place] [address] any warranty or liability for your use of this information. Medicare Wellness Visit, Male    The best way to live healthy is to have a lifestyle where you eat a well-balanced diet, exercise regularly, limit alcohol use, and quit all forms of tobacco/nicotine, if applicable. Regular preventive services are another way to keep healthy. Preventive services (vaccines, screening tests, monitoring & exams) can help personalize your care plan, which helps you manage your own care. Screening tests can find health problems at the earliest stages, when they are easiest to treat. [facility] follows the current, evidence-based guidelines published by the [facility] Joel Polk ([facility]) when recommending preventive services for our patients. Because we follow these guidelines, sometimes recommendations change over time as research supports it. (For example, a prostate screening blood test is no longer routinely recommended for men with no symptoms.)  Of course, you and your doctor may decide to screen more often for some diseases, based on your risk and co-morbidities (chronic disease you are already diagnosed with). Preventive services for you include:  - Medicare offers their members a free annual wellness visit, which is time for you and your primary care provider to discuss and plan for your preventive service needs. Take advantage of this benefit every year!  -All adults over age 72 should receive the recommended pneumonia vaccines. Current USPSTF guidelines recommend a series of two vaccines for the best pneumonia protection.   -All adults should have a flu vaccine yearly and an ECG.  All adults age 61 and older should receive a shingles vaccine once in their lifetime.    -All adults age 38-68 who are overweight should have a diabetes screening test once every three years.   -Other screening tests & preventive services for persons with diabetes include: an eye exam to screen for diabetic retinopathy, a kidney function test, a foot exam, and stricter control over your cholesterol.   -Cardiovascular screening for adults with routine risk involves an electrocardiogram (ECG) at intervals determined by the provider.   -Colorectal cancer screening should be done for adults age 54-65 with no increased risk factors for colorectal cancer. There are a number of acceptable methods of screening for this type of cancer. Each test has its own benefits and drawbacks. Discuss with your provider what is most appropriate for you during your annual wellness visit. The different tests include: colonoscopy (considered the best screening method), a fecal occult blood test, a fecal DNA test, and sigmoidoscopy.  -All adults born between [facility] should be screened once for Hepatitis C.  -An Abdominal Aortic Aneurysm (AAA) Screening is recommended for men age 73-68 who has ever smoked in their lifetime. Here is a list of your current Health Maintenance items (your personalized list of preventive services) with a due date: There are no preventive care reminders to display for this patient.

## 2019-07-18 NOTE — PROGRESS NOTES
This is the Subsequent Medicare Annual Wellness Exam, performed 12 months or more after the Initial AWV or the last Subsequent AWV    I have reviewed the patient's medical history in detail and updated the computerized patient record. History     Past Medical History:   Diagnosis Date    Actinic keratosis, hx of     Actinic Keratoses Head, limbs    Allergic rhinitis     Benign prostatic hypertrophy     CAD (coronary artery disease)     anterior MI ;  by cath 2008    Hypercholesterolemia     Amando-Parkinson-White (WPW) syndrome 1973    status post ablation      Past Surgical History:   Procedure Laterality Date    CABG, ARTERY-VEIN, FOUR  2006    x 4    CARDIAC SURG PROCEDURE UNLIST  ,     COLONOSCOPY N/A 2019    COLONOSCOPY performed by Fam Ahumada MD at [address]; HI RISK IND  2019         ENDOSCOPY, COLON, DIAGNOSTIC      neg; 10 years; Dr. Margie Calderon HX COLONOSCOPY  14    Repeat in 5 years    HX HEENT Left     left eye surgery ([place] on the eye)    HX ORTHOPAEDIC      meniscus repair (unknown which side)   Swathi Urbano    GA COLON CA SCRN NOT  W  St IND  2014    Treating by dermatologist     Current Outpatient Medications   Medication Sig Dispense Refill    clotrimazole-betamethasone (LOTRISONE) topical cream Apply  to affected area two (2) times a day. As needed. 45 g 3    ipratropium (ATROVENT) 0.03 % nasal spray 1-2 sprays three times a day as needed. 60 mL 3    polyvinyl alcohol (LIQUIFILM TEARS) 1.4 % ophthalmic solution Administer 1-2 Drops to both eyes as needed.  aspirin delayed-release 81 mg tablet Take 81 mg by mouth every Monday, Wednesday, Friday.  ketoconazole (NIZORAL) 2 % topical cream Apply  to affected area two (2) times daily as needed. For jock itch rash.  Dispense 90 day supply 90 g 3     Allergies   Allergen Reactions    Amoxicillin Hives    Atorvastatin Other (comments)     Felt bad; constipation; urine dark but no myalgia    Pravastatin Other (comments)     Difficulty raising right toe and issues walking    Simvastatin Myalgia     Family History   Problem Relation Age of Onset    Cancer Father 71        Brain    Other Father         Cholitis    Cancer Mother 80        Pancreatic CA    Heart Disease Brother     Heart Attack Brother         at least 3 (first in his 45s)    Stroke Maternal Grandmother     No Known Problems Brother     No Known Problems Son     No Known Problems Son     No Known Problems Daughter     Asthma Paternal Grandmother     Cancer Paternal Uncle     Heart Disease Maternal Grandfather      Social History     Tobacco Use    Smoking status: Never Smoker    Smokeless tobacco: Never Used   Substance Use Topics    Alcohol use: No     Patient Active Problem List   Diagnosis Code    CAD (coronary artery disease) I25.10    Hypercholesterolemia E78.00    Benign prostatic hyperplasia without lower urinary tract symptoms N40.0    Allergic rhinitis J30.9    Musculoskeletal neck pain M54.2    S/P CABG x 4 Z95.1    Vasomotor rhinitis J30.0    Chronic right hip pain M25.551, G89.29    Actinic keratoses L57.0    Elevated blood pressure reading without diagnosis of hypertension R03.0    Benign colon polyp K63.5    Statin intolerance Z78.9    Primary insomnia F51.01       Depression Risk Factor Screening:     3 most recent PHQ Screens 7/18/2019   Little interest or pleasure in doing things Not at all   Feeling down, depressed, irritable, or hopeless Not at all   Total Score PHQ 2 0     Alcohol Risk Factor Screening: You do not drink alcohol or very rarely. Functional Ability and Level of Safety:   Hearing Loss  Hearing is good. Activities of Daily Living  The home contains: no safety equipment. Patient does total self care    Fall Risk  Fall Risk Assessment, last 12 mths 7/18/2019   Able to walk?  Yes   Fall in past 12 months? No   Fall with injury? -   Number of falls in past 12 months -   Fall Risk Score -       Abuse Screen  Patient is not abused    Cognitive Screening   Evaluation of Cognitive Function:  Has your family/caregiver stated any concerns about your memory: no  Normal    Patient Care Team   Patient Care Team:  Kaylie Goodwin MD as PCP - General (Internal Medicine)  Jesus Otto MD as Physician (Cardiology)  Johnnie Sage MD as Physician (Gastroenterology)  Dr. Linda Dover (Optometry)  Basilia Marie MD as Physician (Dermatology)  Duane Mirza MD as Physician (Ophthalmology)  Joanne Morales MD (Urology)    Assessment/Plan   Education and counseling provided:  Are appropriate based on today's review and evaluation    Diagnoses and all orders for this visit:    1. Coronary artery disease due to lipid rich plaque    2. Medicare annual wellness visit, subsequent    3. Benign prostatic hyperplasia without lower urinary tract symptoms    4. Screening for osteoporosis  -     DEXA BONE DENSITY STUDY AXIAL; Future    5. Screening for depression  -     DEPRESSION SCREEN ANNUAL        There are no preventive care reminders to display for this patient.

## 2019-07-18 NOTE — TELEPHONE ENCOUNTER
----- Message from Jasiel Tong MD sent at 7/18/2019 10:36 AM EDT -----  Please call Dr. Dar Rush for last office visit in May.

## 2019-07-18 NOTE — PROGRESS NOTES
Jordan Feldman is a 67 y.o. male who presents for follow up. Colonoscopy, normal, Dr. Tammy Carson 2019. Normal BM. Followed by cardiology, Dr. Lucretia Goncalves, annually. History of WPW. History of CAD with prior CABG . Cath in  with patent grafts. He is statin intolerant. HDL 35, LDL 90 statin intolerant. He is able to tolerate aspirin 81mg three days a week. Stopped daily use due to bruising and bleeding with shaving. Has been going to gym. Injured both shoulders on a machine 1 1/2 years ago. Feels some sharp pain in left shoulder with reaching behind, he reduced activity recently, less lifting, and somewhat better now. Is still walking. No decline in exercise, in fact, he feels that exercise tolerance is better now. Healthy diet. No sx with exertion. no claudication.       Saw Dr. Shea Hudson, urology in the past for BPH. PSA stable, 2019. no change in urination. up once at night. considering urolift procedure. Due for derm, annual exam. Has actinic keratosis, no skin cancer. Up to date on eye exam. Dr. Kristy Carrizales.      Past Medical History:   Diagnosis Date    Actinic keratosis, hx of     Actinic Keratoses Head, limbs    Allergic rhinitis     Benign prostatic hypertrophy     CAD (coronary artery disease)     anterior MI ;  by cath 2008    Hypercholesterolemia     Amando-Parkinson-White (WPW) syndrome 1973    status post ablation       Family History   Problem Relation Age of Onset    Cancer Father 71        Brain    Other Father         Cholitis    Cancer Mother 80        Pancreatic CA    Heart Disease Brother     Heart Attack Brother         at least 3 (first in his 45s)    Stroke Maternal Grandmother     No Known Problems Brother     No Known Problems Son     No Known Problems Son     No Known Problems Daughter     Asthma Paternal Grandmother     Cancer Paternal Uncle     Heart Disease Maternal Grandfather        Social History     Socioeconomic History    Marital status:      Spouse name: Not on file    Number of children: Not on file    Years of education: Not on file    Highest education level: Not on file   Occupational History    Not on file   Social Needs    Financial resource strain: Not on file    Food insecurity:     Worry: Not on file     Inability: Not on file    Transportation needs:     Medical: Not on file     Non-medical: Not on file   Tobacco Use    Smoking status: Never Smoker    Smokeless tobacco: Never Used   Substance and Sexual Activity    Alcohol use: No    Drug use: No    Sexual activity: Not Currently     Partners: Female   Lifestyle    Physical activity:     Days per week: Not on file     Minutes per session: Not on file    Stress: Not on file   Relationships    Social connections:     Talks on phone: Not on file     Gets together: Not on file     Attends Anabaptist service: Not on file     Active member of club or organization: Not on file     Attends meetings of clubs or organizations: Not on file     Relationship status: Not on file    Intimate partner violence:     Fear of current or ex partner: Not on file     Emotionally abused: Not on file     Physically abused: Not on file     Forced sexual activity: Not on file   Other Topics Concern    Not on file   Social History Narrative    Not on file       Current Outpatient Medications on File Prior to Visit   Medication Sig Dispense Refill    clotrimazole-betamethasone (LOTRISONE) topical cream Apply  to affected area two (2) times a day. As needed. 45 g 3    ipratropium (ATROVENT) 0.03 % nasal spray 1-2 sprays three times a day as needed. 60 mL 3    polyvinyl alcohol (LIQUIFILM TEARS) 1.4 % ophthalmic solution Administer 1-2 Drops to both eyes as needed.  aspirin delayed-release 81 mg tablet Take 81 mg by mouth every Monday, Wednesday, Friday.  ketoconazole (NIZORAL) 2 % topical cream Apply  to affected area two (2) times daily as needed.  For jock itch rash. Dispense 90 day supply 90 g 3     No current facility-administered medications on file prior to visit. Review of Systems  Pertinent items are noted in HPI. Objective:     Visit Vitals  /70 (BP 1 Location: Left arm, BP Patient Position: Sitting)   Pulse 62   Temp 97.5 °F (36.4 °C) (Oral)   Resp 16   Ht 5' 10\" (1.778 m)   Wt 177 lb (80.3 kg)   SpO2 96%   BMI 25.40 kg/m²     Gen: well appearing male  HEENT:   PERRL,normal conjunctiva. External ear and canals normal, TMs no opacification or erythema,  OP no erythema, no exudates, MMM  Neck:  No masses or LAD  Resp:  No wheezing, no rhonchi, no rales. CV:  RRR, normal S1S2, no murmur. GI: soft, nontender, without masses. Extrem:  +2 pulses, no edema, warm distally      Assessment/Plan:       ICD-10-CM ICD-9-CM    1. Coronary artery disease due to lipid rich plaque I25.10 414.00     I25.83 414.3    2. Medicare annual wellness visit, subsequent Z00.00 V70.0    3. Benign prostatic hyperplasia without lower urinary tract symptoms N40.0 600.00    4. Screening for osteoporosis Z13.820 V82.81 DEXA BONE DENSITY STUDY AXIAL   5. Screening for depression Z13.31 V79.0 DEPRESSION SCREEN ANNUAL       Follow-up and Dispositions    · Return in about 6 months (around 1/18/2020) for follow up on medications. Genevieve Hall MD    Discussed the patient's BMI with him. The BMI follow up plan is as follows:     dietary management education, guidance, and counseling  encourage exercise  monitor weight  prescribed dietary intake    An After Visit Summary was printed and given to the patient.

## 2019-07-23 ENCOUNTER — TELEPHONE (OUTPATIENT)
Dept: INTERNAL MEDICINE CLINIC | Age: 79
End: 2019-07-23

## 2019-07-23 NOTE — TELEPHONE ENCOUNTER
[address] [facility] needs the order faxed to them right away as pt is there now.         Fax #[phone]  Attn: Dee Up

## 2019-08-16 DIAGNOSIS — Z13.820 SCREENING FOR OSTEOPOROSIS: ICD-10-CM

## 2020-01-14 ENCOUNTER — OFFICE VISIT (OUTPATIENT)
Dept: INTERNAL MEDICINE CLINIC | Age: 80
End: 2020-01-14

## 2020-01-14 VITALS
HEIGHT: 70 IN | OXYGEN SATURATION: 96 % | BODY MASS INDEX: 25.4 KG/M2 | SYSTOLIC BLOOD PRESSURE: 97 MMHG | RESPIRATION RATE: 16 BRPM | TEMPERATURE: 97.7 F | DIASTOLIC BLOOD PRESSURE: 61 MMHG | HEART RATE: 67 BPM

## 2020-01-14 DIAGNOSIS — E78.00 HYPERCHOLESTEROLEMIA: ICD-10-CM

## 2020-01-14 DIAGNOSIS — Z00.00 MEDICARE ANNUAL WELLNESS VISIT, SUBSEQUENT: ICD-10-CM

## 2020-01-14 DIAGNOSIS — I25.10 CORONARY ARTERY DISEASE DUE TO LIPID RICH PLAQUE: Primary | ICD-10-CM

## 2020-01-14 DIAGNOSIS — D69.6 THROMBOCYTOPENIA (HCC): ICD-10-CM

## 2020-01-14 DIAGNOSIS — I25.83 CORONARY ARTERY DISEASE DUE TO LIPID RICH PLAQUE: Primary | ICD-10-CM

## 2020-01-14 DIAGNOSIS — B35.6 TINEA CRURIS: ICD-10-CM

## 2020-01-14 DIAGNOSIS — N40.0 BENIGN PROSTATIC HYPERPLASIA WITHOUT LOWER URINARY TRACT SYMPTOMS: ICD-10-CM

## 2020-01-14 RX ORDER — KETOCONAZOLE 20 MG/G
CREAM TOPICAL
Qty: 90 G | Refills: 3 | Status: SHIPPED | OUTPATIENT
Start: 2020-01-14 | End: 2020-08-17 | Stop reason: SDUPTHER

## 2020-01-14 NOTE — PROGRESS NOTES
This is the Subsequent Medicare Annual Wellness Exam, performed 12 months or more after the Initial AWV or the last Subsequent AWV    I have reviewed the patient's medical history in detail and updated the computerized patient record.      History     Patient Active Problem List   Diagnosis Code    CAD (coronary artery disease) I25.10    Hypercholesterolemia E78.00    Benign prostatic hyperplasia without lower urinary tract symptoms N40.0    Allergic rhinitis J30.9    Musculoskeletal neck pain M54.2    S/P CABG x 4 Z95.1    Vasomotor rhinitis J30.0    Chronic right hip pain M25.551, G89.29    Actinic keratoses L57.0    Elevated blood pressure reading without diagnosis of hypertension R03.0    Benign colon polyp K63.5    Statin intolerance Z78.9    Primary insomnia F51.01     Past Medical History:   Diagnosis Date    Actinic keratosis, hx of     Actinic Keratoses Head, limbs    Allergic rhinitis     Benign prostatic hypertrophy     CAD (coronary artery disease)     anterior MI ;  by cath 2008    Hypercholesterolemia     Amando-Parkinson-White (WPW) syndrome 1973    status post ablation      Past Surgical History:   Procedure Laterality Date    CABG, ARTERY-VEIN, FOUR  2006    x 4    CARDIAC SURG PROCEDURE UNLIST  ,     COLONOSCOPY N/A 2019    COLONOSCOPY performed by Meryle Mealing., MD at [address]; HI RISK IND  2019         ENDOSCOPY, COLON, DIAGNOSTIC      neg; 10 years; Dr. Cayden Jimenez HX COLONOSCOPY  14    Repeat in 5 years    HX HEENT Left     left eye surgery (Monia Meeta on the eye)    HX ORTHOPAEDIC      meniscus repair (unknown which side)   Dianne Abler    Dr. Gale Escobar    [place] COLON CA SCRN NOT  [address] IND  2014    Treating by dermatologist     Current Outpatient Medications   Medication Sig Dispense Refill    clotrimazole-betamethasone (LOTRISONE) topical cream Apply  to affected area two (2) times a day. As needed. 45 g 3    ipratropium (ATROVENT) 0.03 % nasal spray 1-2 sprays three times a day as needed. 60 mL 3    polyvinyl alcohol (LIQUIFILM TEARS) 1.4 % ophthalmic solution Administer 1-2 Drops to both eyes as needed.  ketoconazole (NIZORAL) 2 % topical cream Apply  to affected area two (2) times daily as needed. For jock itch rash. Dispense 90 day supply 90 g 3    aspirin delayed-release 81 mg tablet Take 81 mg by mouth every Monday, Wednesday, Friday. Allergies   Allergen Reactions    Amoxicillin Hives    Atorvastatin Other (comments)     Felt bad; constipation; urine dark but no myalgia    Pravastatin Other (comments)     Difficulty raising right toe and issues walking    Simvastatin Myalgia       Family History   Problem Relation Age of Onset    Cancer Father 71        Brain    Other Father         Cholitis    Cancer Mother 80        Pancreatic CA    Heart Disease Brother     Heart Attack Brother         at least 3 (first in his 45s)    Stroke Maternal Grandmother     No Known Problems Brother     No Known Problems Son     No Known Problems Son     No Known Problems Daughter     Asthma Paternal Grandmother     Cancer Paternal Uncle     Heart Disease Maternal Grandfather      Social History     Tobacco Use    Smoking status: Never Smoker    Smokeless tobacco: Never Used   Substance Use Topics    Alcohol use: No       Depression Risk Factor Screening:     3 most recent PHQ Screens 7/18/2019   Little interest or pleasure in doing things Not at all   Feeling down, depressed, irritable, or hopeless Not at all   Total Score PHQ 2 0       Alcohol Risk Factor Screening (MALE > 65): Do you average more 1 drink per night or more than 7 drinks a week: No    In the past three months have you have had more than 4 drinks containing alcohol on one occasion: No      Functional Ability and Level of Safety:   Hearing: Hearing is good. Activities of Daily Living:   The home contains: no safety equipment. Patient does total self care    Ambulation: with no difficulty    Fall Risk:  Fall Risk Assessment, last 12 mths 7/18/2019   Able to walk? Yes   Fall in past 12 months? No   Fall with injury? -   Number of falls in past 12 months -   Fall Risk Score -       Abuse Screen:  Patient is not abused    Cognitive Screening   Has your family/caregiver stated any concerns about your memory: no  Cognitive Screening: Normal - Verbal Fluency Test    Patient Care Team   Patient Care Team:  Komal Reyes MD as PCP - General (Internal Medicine)  Komal Reyes MD as PCP - [facility]  Tanvir Bradford MD as Physician (Cardiology)  Francine Menezes MD as Physician (Gastroenterology)  Dr. Gill Poon (Optometry)  Anu Frias MD as Physician (Dermatology)  Hua Proctor MD as Physician (Ophthalmology)  Jt Carter MD (Urology)    Assessment/Plan   Education and counseling provided:  Are appropriate based on today's review and evaluation    Diagnoses and all orders for this visit:    1. Medicare annual wellness visit, subsequent        There are no preventive care reminders to display for this patient.

## 2020-01-14 NOTE — PROGRESS NOTES
Denia Maldonado is a 67 y.o. male who presents for follow up. Seen in July. He reports he feels well. No complaints. Colonoscopy, normal, Dr. Indu Hart 2019. Normal BM.      Followed by cardiology, Dr. Kaylah Kapoor, [facility]. History of CAD with prior CABG .  Cath in  with patent grafts. He is statin intolerant.  . He is able to tolerate aspirin 81mg three days a week. Stopped daily use due to bruising. Active routinely, going to the gym.    Mk Madden in the past for BPH. PSA stable, 2019.  No change in urination.  up once at night. Prior discussion regarding urolift procedure, no surgery planned now. Normal BM. Stable weight. Healthy diet.      Due for derm, annual exam. Has actinic keratosis, no skin cancer.       Up to date on eye exam. Dr. Rayna Hawkins.      Refuses shingrix.        Past Medical History:   Diagnosis Date    Actinic keratosis, hx of     Actinic Keratoses Head, limbs    Allergic rhinitis     Benign prostatic hypertrophy     CAD (coronary artery disease)     anterior MI ;  by cath 2008    Hypercholesterolemia     Amando-Parkinson-White (WPW) syndrome 1973    status post ablation       Family History   Problem Relation Age of Onset    Cancer Father 71        Brain    Other Father         Cholitis    Cancer Mother 80        Pancreatic CA    Heart Disease Brother     Heart Attack Brother         at least 3 (first in his 45s)    Stroke Maternal Grandmother     No Known Problems Brother     No Known Problems Son     No Known Problems Son     No Known Problems Daughter     Asthma Paternal Grandmother     Cancer Paternal Uncle     Heart Disease Maternal Grandfather        Social History     Socioeconomic History    Marital status:      Spouse name: Not on file    Number of children: Not on file    Years of education: Not on file    Highest education level: Not on file   Occupational History    Not on file Social Needs    Financial resource strain: Not on file    Food insecurity:     Worry: Not on file     Inability: Not on file    Transportation needs:     Medical: Not on file     Non-medical: Not on file   Tobacco Use    Smoking status: Never Smoker    Smokeless tobacco: Never Used   Substance and Sexual Activity    Alcohol use: No    Drug use: No    Sexual activity: Not Currently     Partners: Female   Lifestyle    Physical activity:     Days per week: Not on file     Minutes per session: Not on file    Stress: Not on file   Relationships    Social connections:     Talks on phone: Not on file     Gets together: Not on file     Attends Anabaptist service: Not on file     Active member of club or organization: Not on file     Attends meetings of clubs or organizations: Not on file     Relationship status: Not on file    Intimate partner violence:     Fear of current or ex partner: Not on file     Emotionally abused: Not on file     Physically abused: Not on file     Forced sexual activity: Not on file   Other Topics Concern    Not on file   Social History Narrative    Not on file       Current Outpatient Medications on File Prior to Visit   Medication Sig Dispense Refill    carboxymethylcellulose sodium (REFRESH OP) Apply  to eye.  clotrimazole-betamethasone (LOTRISONE) topical cream Apply  to affected area two (2) times a day. As needed. 45 g 3    ipratropium (ATROVENT) 0.03 % nasal spray 1-2 sprays three times a day as needed. 60 mL 3    aspirin delayed-release 81 mg tablet Take 81 mg by mouth every Monday, Wednesday, Friday.  polyvinyl alcohol (LIQUIFILM TEARS) 1.4 % ophthalmic solution Administer 1-2 Drops to both eyes as needed.  [DISCONTINUED] ketoconazole (NIZORAL) 2 % topical cream Apply  to affected area two (2) times daily as needed. For jock itch rash. Dispense 90 day supply 90 g 3     No current facility-administered medications on file prior to visit.         Review of Systems  Pertinent items are noted in HPI. Objective:     Visit Vitals  BP 97/61 (BP 1 Location: Left arm, BP Patient Position: Sitting)   Pulse 67   Temp 97.7 °F (36.5 °C) (Oral)   Resp 16   Ht 5' 10\" (1.778 m)   SpO2 96%   BMI 25.40 kg/m²     Gen: well appearing male  HEENT:   PERRL,normal conjunctiva. External ear and canals normal, TMs no opacification or erythema,  OP no erythema, no exudates, MMM  Neck:  Supple. Thyroid normal size, nontender, without nodules. No masses or LAD  Resp:  No wheezing, no rhonchi, no rales. CV:  RRR, normal S1S2, no murmur. GI: soft, nontender, without masses. No hepatosplenomegaly. Extrem:  +2 pulses, no edema, warm distally      Assessment/Plan:       ICD-10-CM ICD-9-CM    1. Coronary artery disease due to lipid rich plaque A63.88 980.46 METABOLIC PANEL, COMPREHENSIVE    I25.83 414.3 CBC W/O DIFF   2. Medicare annual wellness visit, subsequent Z00.00 V70.0    3. Benign prostatic hyperplasia without lower urinary tract symptoms N40.0 600.00    4. Hypercholesterolemia M14.43 088.5 METABOLIC PANEL, COMPREHENSIVE      LIPID PANEL   5. Tinea cruris B35.6 110.3 ketoconazole (NIZORAL) 2 % topical cream   6. Thrombocytopenia (HCC) D69.6 287.5        Follow-up and Dispositions    · Return for follow up 6 months and as needed.   Sherlyn Chua MD

## 2020-01-14 NOTE — PATIENT INSTRUCTIONS
Medicare Wellness Visit, Male    The best way to live healthy is to have a lifestyle where you eat a well-balanced diet, exercise regularly, limit alcohol use, and quit all forms of tobacco/nicotine, if applicable. Regular preventive services are another way to keep healthy. Preventive services (vaccines, screening tests, monitoring & exams) can help personalize your care plan, which helps you manage your own care. Screening tests can find health problems at the earliest stages, when they are easiest to treat. [facility] follows the current, evidence-based guidelines published by the [facility] ([facility]) when recommending preventive services for our patients. Because we follow these guidelines, sometimes recommendations change over time as research supports it. (For example, a prostate screening blood test is no longer routinely recommended for men with no symptoms). Of course, you and your doctor may decide to screen more often for some diseases, based on your risk and co-morbidities (chronic disease you are already diagnosed with). Preventive services for you include:  - Medicare offers their members a free annual wellness visit, which is time for you and your primary care provider to discuss and plan for your preventive service needs. Take advantage of this benefit every year!  -All adults over age 72 should receive the recommended pneumonia vaccines. Current USPSTF guidelines recommend a series of two vaccines for the best pneumonia protection.   -All adults should have a flu vaccine yearly and tetanus vaccine every 10 years.  -All adults age 48 and older should receive the shingles vaccines (series of two vaccines).        -All adults age 38-68 who are overweight should have a diabetes screening test once every three years.   -Other screening tests & preventive services for persons with diabetes include: an eye exam to screen for diabetic retinopathy, a kidney function test, a foot exam, and stricter control over your cholesterol.   -Cardiovascular screening for adults with routine risk involves an electrocardiogram (ECG) at intervals determined by the provider.   -Colorectal cancer screening should be done for adults age 54-65 with no increased risk factors for colorectal cancer. There are a number of acceptable methods of screening for this type of cancer. Each test has its own benefits and drawbacks. Discuss with your provider what is most appropriate for you during your annual wellness visit. The different tests include: colonoscopy (considered the best screening method), a fecal occult blood test, a fecal DNA test, and sigmoidoscopy.  -All adults born between [facility] should be screened once for Hepatitis C.  -An Abdominal Aortic Aneurysm (AAA) Screening is recommended for men age 73-68 who has ever smoked in their lifetime. Here is a list of your current Health Maintenance items (your personalized list of preventive services) with a due date: There are no preventive care reminders to display for this patient. Office Policies    Phone calls/patient messages:            Please allow up to 24 hours for someone in the office to contact you about your call or message. Be mindful your provider may be out of the office or your message may require further review. We encourage you to use [employer] for your messages as this is a faster, more efficient way to communicate with our office                         Medication Refills:            Prescription medications require 48-72 business hours to process. We encourage you to use [employer] for your refills. For controlled medications: Please allow 72 business hours to process. Certain medications may require you to  a written prescription at our office.             NO narcotic/controlled medications will be prescribed after 4pm Monday through Friday or on weekends Form/Paperwork Completion:            Please note a $25 fee may incur for all paperwork for completed by our providers. We ask that you allow 7-10 business days. Pre-payment is due prior to picking up/faxing the completed form. You may also download your forms to [employer] to have your doctor print off.

## 2020-01-15 LAB
ALBUMIN SERPL-MCNC: 4.3 G/DL (ref 3.5–4.8)
ALBUMIN/GLOB SERPL: 1.7 {RATIO} (ref 1.2–2.2)
ALP SERPL-CCNC: 103 IU/L (ref 39–117)
ALT SERPL-CCNC: 21 IU/L (ref 0–44)
AST SERPL-CCNC: 26 IU/L (ref 0–40)
BILIRUB SERPL-MCNC: 0.7 MG/DL (ref 0–1.2)
BUN SERPL-MCNC: 14 MG/DL (ref 8–27)
BUN/CREAT SERPL: 12 (ref 10–24)
CALCIUM SERPL-MCNC: 9.1 MG/DL (ref 8.6–10.2)
CHLORIDE SERPL-SCNC: 103 MMOL/L (ref 96–106)
CHOLEST SERPL-MCNC: 138 MG/DL (ref 100–199)
CO2 SERPL-SCNC: 25 MMOL/L (ref 20–29)
CREAT SERPL-MCNC: 1.16 MG/DL (ref 0.76–1.27)
ERYTHROCYTE [DISTWIDTH] IN BLOOD BY AUTOMATED COUNT: 12.6 % (ref 11.6–15.4)
GLOBULIN SER CALC-MCNC: 2.5 G/DL (ref 1.5–4.5)
GLUCOSE SERPL-MCNC: 90 MG/DL (ref 65–99)
HCT VFR BLD AUTO: 44.8 % (ref 37.5–51)
HDLC SERPL-MCNC: 38 MG/DL
HGB BLD-MCNC: 15.1 G/DL (ref 13–17.7)
LDLC SERPL CALC-MCNC: 87 MG/DL (ref 0–99)
MCH RBC QN AUTO: 30.2 PG (ref 26.6–33)
MCHC RBC AUTO-ENTMCNC: 33.7 G/DL (ref 31.5–35.7)
MCV RBC AUTO: 90 FL (ref 79–97)
PLATELET # BLD AUTO: 157 X10E3/UL (ref 150–450)
POTASSIUM SERPL-SCNC: 4.8 MMOL/L (ref 3.5–5.2)
PROT SERPL-MCNC: 6.8 G/DL (ref 6–8.5)
RBC # BLD AUTO: 5 X10E6/UL (ref 4.14–5.8)
SODIUM SERPL-SCNC: 141 MMOL/L (ref 134–144)
TRIGL SERPL-MCNC: 66 MG/DL (ref 0–149)
VLDLC SERPL CALC-MCNC: 13 MG/DL (ref 5–40)
WBC # BLD AUTO: 3.4 X10E3/UL (ref 3.4–10.8)

## 2020-02-03 ENCOUNTER — PATIENT MESSAGE (OUTPATIENT)
Dept: INTERNAL MEDICINE CLINIC | Age: 80
End: 2020-02-03

## 2020-02-03 DIAGNOSIS — J30.0 VASOMOTOR RHINITIS: ICD-10-CM

## 2020-02-04 RX ORDER — CLOTRIMAZOLE AND BETAMETHASONE DIPROPIONATE 10; .64 MG/G; MG/G
CREAM TOPICAL 2 TIMES DAILY
Qty: 45 G | Refills: 3 | Status: SHIPPED | OUTPATIENT
Start: 2020-02-04 | End: 2021-07-19 | Stop reason: SDUPTHER

## 2020-02-04 RX ORDER — IPRATROPIUM BROMIDE 21 UG/1
SPRAY, METERED NASAL
Qty: 60 ML | Refills: 3 | Status: SHIPPED | OUTPATIENT
Start: 2020-02-04 | End: 2021-01-29 | Stop reason: SDUPTHER

## 2020-02-04 NOTE — TELEPHONE ENCOUNTER
----- Message from [address] [place] sent at 2/4/2020  8:41 AM EST -----  Regarding: FW: Prescription Question  Contact: [phone]    ----- Message -----  From: Salima Montes  Sent: 2/3/2020  10:42 PM EST  To: [facility] Nurse Pool  Subject: Prescription Question                            I have found a cheaper way to buy Clotrimazole Betamethasone that you prescribed for me last year. Therefore, I need a new prescription, that I, or Renata, can  at the . It should not specify any pharmacy, which will allow me to shop local pharmacies for the best price. It should specify 1 45g tube of cream of 1% /0.05% Clotrimazole/Betamethasone to be used over 30 to 83 days. Please allow 3 refills! I have attached an image of the current coupon for [facility] (Please don't specify [facility] on the Prescription)! [place] went from $16.xx last year to $38.xx this year! Please have Lakeisha Prince let me know when it is ready. [email or web address]  or [phone]. No peterson! Thank you!

## 2020-02-05 ENCOUNTER — TELEPHONE (OUTPATIENT)
Dept: INTERNAL MEDICINE CLINIC | Age: 80
End: 2020-02-05

## 2020-02-05 NOTE — TELEPHONE ENCOUNTER
Called, spoke to pt. Two pt identifiers confirmed. Pt informed prescription is ready for  and at the . Pt verbalized understanding of information discussed w/ no further questions at this time. Rx placed in blue folder.

## 2020-07-16 ENCOUNTER — VIRTUAL VISIT (OUTPATIENT)
Dept: INTERNAL MEDICINE CLINIC | Age: 80
End: 2020-07-16

## 2020-07-16 DIAGNOSIS — I25.83 CORONARY ARTERY DISEASE DUE TO LIPID RICH PLAQUE: Primary | ICD-10-CM

## 2020-07-16 DIAGNOSIS — N40.0 BENIGN PROSTATIC HYPERPLASIA WITHOUT LOWER URINARY TRACT SYMPTOMS: ICD-10-CM

## 2020-07-16 DIAGNOSIS — I25.10 CORONARY ARTERY DISEASE DUE TO LIPID RICH PLAQUE: Primary | ICD-10-CM

## 2020-07-16 DIAGNOSIS — E78.00 HYPERCHOLESTEROLEMIA: ICD-10-CM

## 2020-07-16 NOTE — PROGRESS NOTES
Suad Gonzalez is a 79 y.o. male who presents for follow up. Seen in January. He reports he feels well. No complaints.        Followed by cardiology, Dr. Eliazar Rangel, annually.  2019. History of WPW.  History of CAD with prior CABG .  Cath in  with patent grafts. He is statin intolerant.  . He is able to tolerate aspirin 81mg three days a week. Stopped daily use due to bruising. Active routinely but gym closed. No symptoms with exertion.       Saw Dr. Farzana Kapoor, for BPH. PSA stable, 2019.  No change in urination.  Up once at night. Prior discussion regarding urolift procedure, no surgery planned now. He is considering the surgery in the future, but reports symptoms manageable now.      Normal BM. Stable weight. Healthy diet.      Due for derm, Dr Altamese Frankel. Has actinic keratosis, no skin cancer.      Colonoscopy, normal, Dr. Maddie Camejo 2019. Normal BM.     Up to date on eye exam. Dr. Prerna Arango .      Refuses shingrix. This is an established visit conducted via telemedicine with video. The patient has been instructed that this meets HIPAA criteria and acknowledges and agrees to this method of visitation. Pursuant to the emergency declaration under the [facility], 1135 waiver authority and the [employer] Act, this Virtual Visit was conducted, with patient's consent, to reduce the patient's risk of exposure to COVID-19 and provide continuity of care for an established patient. Services were provided through a video synchronous discussion virtually to substitute for in-person clinic visit.         Past Medical History:   Diagnosis Date    Actinic keratosis, hx of     Actinic Keratoses Head, limbs    Allergic rhinitis     Benign prostatic hypertrophy     CAD (coronary artery disease)     anterior MI ;  by cath 2008    Hypercholesterolemia     Amando-Parkinson-White (WPW) syndrome 1973    status post ablation       Family History   Problem Relation Age of Onset    Cancer Father 71        Brain    Other Father         Cholitis    Cancer Mother 80        Pancreatic CA    Heart Disease Brother     Heart Attack Brother         at least 3 (first in his 45s)    Stroke Maternal Grandmother     No Known Problems Brother     No Known Problems Son     No Known Problems Son     No Known Problems Daughter     Asthma Paternal Grandmother     Cancer Paternal Uncle     Heart Disease Maternal Grandfather        Social History     Socioeconomic History    Marital status:      Spouse name: Not on file    Number of children: Not on file    Years of education: Not on file    Highest education level: Not on file   Occupational History    Not on file   Social Needs    Financial resource strain: Not on file    Food insecurity     Worry: Not on file     Inability: Not on file    Transportation needs     Medical: Not on file     Non-medical: Not on file   Tobacco Use    Smoking status: Never Smoker    Smokeless tobacco: Never Used   Substance and Sexual Activity    Alcohol use: No    Drug use: No    Sexual activity: Not Currently     Partners: Female   Lifestyle    Physical activity     Days per week: Not on file     Minutes per session: Not on file    Stress: Not on file   Relationships    Social connections     Talks on phone: Not on file     Gets together: Not on file     Attends Christian service: Not on file     Active member of club or organization: Not on file     Attends meetings of clubs or organizations: Not on file     Relationship status: Not on file    Intimate partner violence     Fear of current or ex partner: Not on file     Emotionally abused: Not on file     Physically abused: Not on file     Forced sexual activity: Not on file   Other Topics Concern    Not on file   Social History Narrative    Not on file       Current Outpatient Medications on File Prior to Visit   Medication Sig Dispense Refill    ipratropium (ATROVENT) 0.03 % nasal spray USE 1 TO 2 SPRAYS NASALLY THREE TIMES DAILY AS NEEDED 60 mL 3    clotrimazole-betamethasone (LOTRISONE) topical cream Apply  to affected area two (2) times a day. As needed. 45 g 3    carboxymethylcellulose sodium (REFRESH OP) Apply  to eye.  ketoconazole (NIZORAL) 2 % topical cream Apply  to affected area two (2) times daily as needed (for jock itch). For jock itch rash. Dispense 90 day supply 90 g 3    polyvinyl alcohol (LIQUIFILM TEARS) 1.4 % ophthalmic solution Administer 1-2 Drops to both eyes as needed.  aspirin delayed-release 81 mg tablet Take 81 mg by mouth every Monday, Wednesday, Friday. No current facility-administered medications on file prior to visit. Review of Systems  Pertinent items are noted in HPI. Objective:     Gen: well appearing male  HEENT: normal conjunctiva, no audible congestion, patient does not see oral erythema, has MMM  Neck: patient does not feel enlarged or tender LAD or masses  Resp: normal respiratory effort, no audible wheezing. CV: patient does not feel palpitations or heart irregularity  Abd: patient does not feel abdominal tenderness or mass, patient does not notice distension  Extrem: patient does not see swelling in ankles or joints. Neuro: Alert and oriented, able to answer questions without difficulty      Assessment/Plan:       ICD-10-CM ICD-9-CM    1. Coronary artery disease due to lipid rich plaque  I25.10 414.00     I25.83 414.3    2. Benign prostatic hyperplasia without lower urinary tract symptoms  N40.0 600.00    3. Hypercholesterolemia  E78.00 272.0    no change in medications. Medicare wellness and fasting labs in January. This was a telemedicine visit with video.         Misael Arredondo MD    Follow-up and Dispositions    · Return in about 6 months (around 1/16/2021) for medicare wellness/fasting labs in January.

## 2020-08-17 DIAGNOSIS — B35.6 TINEA CRURIS: ICD-10-CM

## 2020-08-17 RX ORDER — KETOCONAZOLE 20 MG/G
CREAM TOPICAL
Qty: 90 G | Refills: 3 | Status: SHIPPED | OUTPATIENT
Start: 2020-08-17 | End: 2020-08-17 | Stop reason: SDUPTHER

## 2020-08-17 RX ORDER — KETOCONAZOLE 20 MG/G
CREAM TOPICAL
Qty: 60 G | Refills: 5 | Status: SHIPPED | OUTPATIENT
Start: 2020-08-17

## 2020-08-17 NOTE — TELEPHONE ENCOUNTER
----- Message from Sanjiv Maradiaga. Francisca Samaniego sent at 8/17/2020  2:48 PM EDT -----  Regarding: Prescription Question  Contact: [phone]  I need a new prescription for Ketoconazole cream.   Please make it for 1 Tube of  60GM 2% 'As needed'. Please mail it to me at [facility], [facility], [address] [place] [address]. I can buy it much cheaper by pricing it myself locally, so don't specify any pharmacy. I already have an old prescription, so please replace the one in your system with this one, so there will be no confusion. It works on a couple of skin problems that I have. Thank you!

## 2021-01-29 DIAGNOSIS — J30.0 VASOMOTOR RHINITIS: ICD-10-CM

## 2021-01-29 RX ORDER — IPRATROPIUM BROMIDE 21 UG/1
SPRAY, METERED NASAL
Qty: 60 ML | Refills: 3 | Status: SHIPPED | OUTPATIENT
Start: 2021-01-29 | End: 2022-03-24 | Stop reason: SDUPTHER

## 2021-07-18 NOTE — PROGRESS NOTES
Sajan Denise is a [de-identified] y.o. male who presents for follow up. Last seen 2020. Followed by cardiology, Dr. Jena Carlson, annually.  History of WPW.  History of CAD with prior CABG .  Cath in  with patent grafts. He is statin intolerant.   He is able to tolerate aspirin 81mg three days a week. Stopped daily use due to bruising. Active routinely, walking every daily, not back at the gym yet. No symptoms with exertion.       Saw Dr. Daylin Murphy, in the past ( he does not do urolift). for BPH. PSA stable, 2019.  stopped PSA screening. No change in urination.  Up once at night. Prior discussion regarding urolift procedure, no surgery planned now. [facility] is considering the surgery in the future.      Due for derm, Dr Angel Mills. Has actinic keratosis, no skin cancer.       Colonoscopy, normal, 2019. Dr. Sasha Martin.   Normal BM.      Up to date on eye exam. Dr. Robertson Collar not want the COVID immunization or further flu shots.           Past Medical History:   Diagnosis Date    Actinic keratosis, hx of     Actinic Keratoses Head, limbs    Allergic rhinitis     Benign prostatic hypertrophy     CAD (coronary artery disease)     anterior MI ;  by cath 2008    Hypercholesterolemia     Amando-Parkinson-White (WPW) syndrome 1973    status post ablation       Family History   Problem Relation Age of Onset    Cancer Father 71        Brain    Other Father         Cholitis    Cancer Mother 80        Pancreatic CA    Heart Disease Brother     Heart Attack Brother         at least 3 (first in his 45s)    Stroke Maternal Grandmother     No Known Problems Brother     No Known Problems Son     No Known Problems Son     No Known Problems Daughter     Asthma Paternal Grandmother     Cancer Paternal Uncle     Heart Disease Maternal Grandfather        Social History     Socioeconomic History    Marital status:      Spouse name: Not on file    Number of children: Not on file    Years of education: Not on file    Highest education level: Not on file   Occupational History    Not on file   Tobacco Use    Smoking status: Never Smoker    Smokeless tobacco: Never Used   Substance and Sexual Activity    Alcohol use: No    Drug use: No    Sexual activity: Not Currently     Partners: Female   Other Topics Concern    Not on file   Social History Narrative    Not on file     Social Determinants of Health     Financial Resource Strain:     Difficulty of Paying Living Expenses:    Food Insecurity:     Worried About Running Out of Food in the Last Year:     [address] in the Last Year:    Transportation Needs:     Lack of Transportation (Medical):  Lack of Transportation (Non-Medical):    Physical Activity:     Days of Exercise per Week:     Minutes of Exercise per Session:    Stress:     Feeling of Stress :    Social Connections:     Frequency of Communication with Friends and Family:     Frequency of Social Gatherings with Friends and Family:     Attends Mandaen Services:     Active Member of Clubs or Organizations:     Attends Club or Organization Meetings:     Marital Status:    Intimate Partner Violence:     Fear of Current or Ex-Partner:     Emotionally Abused:     Physically Abused:     Sexually Abused:        Current Outpatient Medications on File Prior to Visit   Medication Sig Dispense Refill    ipratropium (ATROVENT) 0.03 % nasal spray USE 1 TO 2 SPRAYS NASALLY THREE TIMES DAILY AS NEEDED 60 mL 3    ketoconazole (NIZORAL) 2 % topical cream Apply  to affected area two (2) times daily as needed (for jock itch). 60 g 5    clotrimazole-betamethasone (LOTRISONE) topical cream Apply  to affected area two (2) times a day. As needed. 45 g 3    carboxymethylcellulose sodium (REFRESH OP) Apply  to eye.  aspirin delayed-release 81 mg tablet Take 81 mg by mouth every Monday, Wednesday, Friday.       polyvinyl alcohol (LIQUIFILM TEARS) 1.4 % ophthalmic solution Administer 1-2 Drops to both eyes as needed. No current facility-administered medications on file prior to visit. Review of Systems  Pertinent items are noted in HPI. Objective:     Visit Vitals  /71 (BP 1 Location: Left arm, BP Patient Position: Sitting, BP Cuff Size: Adult)   Pulse 68   Temp 98.3 °F (36.8 °C) (Oral)   Resp 15   Ht 5' 10\" (1.778 m)   Wt 176 lb 9.6 oz (80.1 kg)   SpO2 97%   BMI 25.34 kg/m²     Gen: well appearing male  HEENT:   PERRL,normal conjunctiva. External ear and canals normal, TMs no opacification or erythema,  OP no erythema, no exudates, MMM  Neck:  Supple. Thyroid normal size, nontender, without nodules. No masses or LAD  Resp:  No wheezing, no rhonchi, no rales. CV:  RRR, normal S1S2, no murmur. GI: soft, nontender, without masses. No hepatosplenomegaly. Extrem:  +2 pulses, no edema, warm distally      Assessment/Plan:       ICD-10-CM ICD-9-CM    1. Coronary artery disease due to lipid rich plaque  I25.10 414.00     I25.83 414.3    2. Medicare annual wellness visit, subsequent  Z00.00 V70.0    3. Screening for depression  Z13.31 V79.0 DEPRESSION SCREEN ANNUAL   4. Thrombocytopenia (HCC)  D69.6 287.5 CBC W/O DIFF   5. Benign prostatic hyperplasia without lower urinary tract symptoms  N40.0 600.00    6. Hypercholesterolemia  T12.44 446.1 METABOLIC PANEL, COMPREHENSIVE      LIPID PANEL   7. Vitamin D deficiency  E55.9 268.9 VITAMIN D, 25 HYDROXY     Recommend heart healthy diet and regular cardiovascular exercise. Follow-up and Dispositions    · Return for annual follow up. Kiersten Mendoza MD      This is the Subsequent Medicare Annual Wellness Exam, performed 12 months or more after the Initial AWV or the last Subsequent AWV    I have reviewed the patient's medical history in detail and updated the computerized patient record.        Assessment/Plan   Education and counseling provided:  Are appropriate based on today's review and evaluation    1. Coronary artery disease due to lipid rich plaque  2. Medicare annual wellness visit, subsequent  3. Screening for depression  -     DEPRESSION SCREEN ANNUAL  4. Thrombocytopenia (HCC)  -     CBC W/O DIFF; Future  5. Benign prostatic hyperplasia without lower urinary tract symptoms  6. Hypercholesterolemia  -     METABOLIC PANEL, COMPREHENSIVE; Future  -     LIPID PANEL; Future  7. Vitamin D deficiency  -     VITAMIN D, 25 HYDROXY; Future       Depression Risk Factor Screening     3 most recent PHQ Screens 1/14/2020   Little interest or pleasure in doing things Not at all   Feeling down, depressed, irritable, or hopeless Not at all   Total Score PHQ 2 0       Alcohol Risk Screen    Do you average more than 1 drink per night or more than 7 drinks a week: No    In the past three months have you have had more than 4 drinks containing alcohol on one occasion: No        Functional Ability and Level of Safety    Hearing: Hearing is good. Activities of Daily Living: The home contains: no safety equipment. Patient does total self care      Ambulation: with no difficulty     Fall Risk:  Fall Risk Assessment, last 12 mths 7/19/2021   Able to walk? Yes   Fall in past 12 months? 0   Do you feel unsteady? 0   Are you worried about falling 0   Number of falls in past 12 months -   Fall with injury?  -      Abuse Screen:  Patient is not abused       Cognitive Screening    Has your family/caregiver stated any concerns about your memory: no     Cognitive Screening: Normal - Verbal Fluency Test    Health Maintenance Due     Health Maintenance Due   Topic Date Due    COVID-19 Vaccine (1) Never done    Shingrix Vaccine Age 50> (1 of 2) Never done       Patient Care Team   Patient Care Team:  Vamshi Jeter MD as PCP - General (Internal Medicine)  Vamshi Jeter MD as PCP - REHABILITATION [facility] Empaneled Provider  Lanny Shanks MD as Physician (Cardiology)  Nadia Odonnell MD as Physician (Gastroenterology)  Dr. Kaden Leong (Optometry)  Vishal Lin MD as Physician (Dermatology)  Pavan Beasley MD as Physician (Ophthalmology)  Othel Closs., MD (Urology)    History     Patient Active Problem List   Diagnosis Code    CAD (coronary artery disease) I25.10    Hypercholesterolemia E78.00    Benign prostatic hyperplasia without lower urinary tract symptoms N40.0    Allergic rhinitis J30.9    Musculoskeletal neck pain M54.2    S/P CABG x 4 Z95.1    Vasomotor rhinitis J30.0    Chronic right hip pain M25.551, G89.29    Actinic keratoses L57.0    Elevated blood pressure reading without diagnosis of hypertension R03.0    Benign colon polyp K63.5    Statin intolerance Z78.9    Primary insomnia F51.01    Thrombocytopenia (HCC) D69.6    Tinea cruris B35.6     Past Medical History:   Diagnosis Date    Actinic keratosis, hx of     Actinic Keratoses Head, limbs    Allergic rhinitis     Benign prostatic hypertrophy     CAD (coronary artery disease)     anterior MI ;  by cath 2008    Hypercholesterolemia     Amando-Parkinson-White (WPW) syndrome 1973    status post ablation      Past Surgical History:   Procedure Laterality Date    CABG, ARTERY-VEIN, FOUR  2006    x 4    CARDIAC SURG PROCEDURE UNLIST  , 2006    COLONOSCOPY N/A 2019    COLONOSCOPY performed by Marisela Lackey MD at [facility]; HI RISK IND  2019         ENDOSCOPY, COLON, DIAGNOSTIC  2004    neg; 10 years; Dr. Alicea Satsuma HX COLONOSCOPY  14    Repeat in 5 years    HX HEENT Left     left eye surgery (Jennifer Leblanc on the eye)    HX ORTHOPAEDIC      meniscus repair (unknown which side)   Santos Travis    DE COLON CA SCRN NOT  [address] IND  2014    Treating by dermatologist     Current Outpatient Medications   Medication Sig Dispense Refill    ipratropium (ATROVENT) 0.03 % nasal spray USE 1 TO 2 SPRAYS NASALLY THREE TIMES DAILY AS NEEDED 60 mL 3    ketoconazole (NIZORAL) 2 % topical cream Apply  to affected area two (2) times daily as needed (for jock itch). 60 g 5    clotrimazole-betamethasone (LOTRISONE) topical cream Apply  to affected area two (2) times a day. As needed. 45 g 3    carboxymethylcellulose sodium (REFRESH OP) Apply  to eye.  aspirin delayed-release 81 mg tablet Take 81 mg by mouth every Monday, Wednesday, Friday.  polyvinyl alcohol (LIQUIFILM TEARS) 1.4 % ophthalmic solution Administer 1-2 Drops to both eyes as needed.        Allergies   Allergen Reactions    Amoxicillin Hives    Atorvastatin Other (comments)     Felt bad; constipation; urine dark but no myalgia    Pravastatin Other (comments)     Difficulty raising right toe and issues walking    Simvastatin Myalgia       Family History   Problem Relation Age of Onset    Cancer Father 71        Brain    Other Father         Cholitis    Cancer Mother 80        Pancreatic CA    Heart Disease Brother     Heart Attack Brother         at least 3 (first in his 45s)    Stroke Maternal Grandmother     No Known Problems Brother     No Known Problems Son     No Known Problems Son     No Known Problems Daughter     Asthma Paternal Grandmother     Cancer Paternal Uncle     Heart Disease Maternal Grandfather      Social History     Tobacco Use    Smoking status: Never Smoker    Smokeless tobacco: Never Used   Substance Use Topics    Alcohol use: No         Suly Pickering MD

## 2021-07-19 ENCOUNTER — OFFICE VISIT (OUTPATIENT)
Dept: INTERNAL MEDICINE CLINIC | Age: 81
End: 2021-07-19
Payer: MEDICARE

## 2021-07-19 VITALS
HEIGHT: 70 IN | RESPIRATION RATE: 15 BRPM | WEIGHT: 176.6 LBS | SYSTOLIC BLOOD PRESSURE: 132 MMHG | DIASTOLIC BLOOD PRESSURE: 71 MMHG | OXYGEN SATURATION: 97 % | HEART RATE: 68 BPM | TEMPERATURE: 98.3 F | BODY MASS INDEX: 25.28 KG/M2

## 2021-07-19 DIAGNOSIS — E55.9 VITAMIN D DEFICIENCY: ICD-10-CM

## 2021-07-19 DIAGNOSIS — I25.83 CORONARY ARTERY DISEASE DUE TO LIPID RICH PLAQUE: Primary | ICD-10-CM

## 2021-07-19 DIAGNOSIS — D69.6 THROMBOCYTOPENIA (HCC): ICD-10-CM

## 2021-07-19 DIAGNOSIS — Z13.31 SCREENING FOR DEPRESSION: ICD-10-CM

## 2021-07-19 DIAGNOSIS — E78.00 HYPERCHOLESTEROLEMIA: ICD-10-CM

## 2021-07-19 DIAGNOSIS — N40.0 BENIGN PROSTATIC HYPERPLASIA WITHOUT LOWER URINARY TRACT SYMPTOMS: ICD-10-CM

## 2021-07-19 DIAGNOSIS — I25.10 CORONARY ARTERY DISEASE DUE TO LIPID RICH PLAQUE: Primary | ICD-10-CM

## 2021-07-19 DIAGNOSIS — Z00.00 MEDICARE ANNUAL WELLNESS VISIT, SUBSEQUENT: ICD-10-CM

## 2021-07-19 LAB
25(OH)D3 SERPL-MCNC: 31.4 NG/ML (ref 30–100)
ALBUMIN SERPL-MCNC: 4.2 G/DL (ref 3.5–5)
ALBUMIN/GLOB SERPL: 1.4 {RATIO} (ref 1.1–2.2)
ALP SERPL-CCNC: 101 U/L (ref 45–117)
ALT SERPL-CCNC: 24 U/L (ref 12–78)
ANION GAP SERPL CALC-SCNC: 2 MMOL/L (ref 5–15)
AST SERPL-CCNC: 21 U/L (ref 15–37)
BILIRUB SERPL-MCNC: 0.7 MG/DL (ref 0.2–1)
BUN SERPL-MCNC: 14 MG/DL (ref 6–20)
BUN/CREAT SERPL: 14 (ref 12–20)
CALCIUM SERPL-MCNC: 8.9 MG/DL (ref 8.5–10.1)
CHLORIDE SERPL-SCNC: 111 MMOL/L (ref 97–108)
CHOLEST SERPL-MCNC: 153 MG/DL
CO2 SERPL-SCNC: 29 MMOL/L (ref 21–32)
CREAT SERPL-MCNC: 1 MG/DL (ref 0.7–1.3)
ERYTHROCYTE [DISTWIDTH] IN BLOOD BY AUTOMATED COUNT: 13 % (ref 11.5–14.5)
GLOBULIN SER CALC-MCNC: 2.9 G/DL (ref 2–4)
GLUCOSE SERPL-MCNC: 96 MG/DL (ref 65–100)
HCT VFR BLD AUTO: 47.2 % (ref 36.6–50.3)
HDLC SERPL-MCNC: 43 MG/DL
HDLC SERPL: 3.6 {RATIO} (ref 0–5)
HGB BLD-MCNC: 14.8 G/DL (ref 12.1–17)
LDLC SERPL CALC-MCNC: 98.6 MG/DL (ref 0–100)
MCH RBC QN AUTO: 29.6 PG (ref 26–34)
MCHC RBC AUTO-ENTMCNC: 31.4 G/DL (ref 30–36.5)
MCV RBC AUTO: 94.4 FL (ref 80–99)
NRBC # BLD: 0 K/UL (ref 0–0.01)
NRBC BLD-RTO: 0 PER 100 WBC
PLATELET # BLD AUTO: 152 K/UL (ref 150–400)
PMV BLD AUTO: 10.6 FL (ref 8.9–12.9)
POTASSIUM SERPL-SCNC: 4.6 MMOL/L (ref 3.5–5.1)
PROT SERPL-MCNC: 7.1 G/DL (ref 6.4–8.2)
RBC # BLD AUTO: 5 M/UL (ref 4.1–5.7)
SODIUM SERPL-SCNC: 142 MMOL/L (ref 136–145)
TRIGL SERPL-MCNC: 57 MG/DL (ref ?–150)
VLDLC SERPL CALC-MCNC: 11.4 MG/DL
WBC # BLD AUTO: 4.4 K/UL (ref 4.1–11.1)

## 2021-07-19 PROCEDURE — 1101F PT FALLS ASSESS-DOCD LE1/YR: CPT | Performed by: FAMILY MEDICINE

## 2021-07-19 PROCEDURE — G8419 CALC BMI OUT NRM PARAM NOF/U: HCPCS | Performed by: FAMILY MEDICINE

## 2021-07-19 PROCEDURE — 99214 OFFICE O/P EST MOD 30 MIN: CPT | Performed by: FAMILY MEDICINE

## 2021-07-19 PROCEDURE — G0439 PPPS, SUBSEQ VISIT: HCPCS | Performed by: FAMILY MEDICINE

## 2021-07-19 PROCEDURE — G8536 NO DOC ELDER MAL SCRN: HCPCS | Performed by: FAMILY MEDICINE

## 2021-07-19 PROCEDURE — G8427 DOCREV CUR MEDS BY ELIG CLIN: HCPCS | Performed by: FAMILY MEDICINE

## 2021-07-19 PROCEDURE — G8432 DEP SCR NOT DOC, RNG: HCPCS | Performed by: FAMILY MEDICINE

## 2021-07-19 RX ORDER — CLOTRIMAZOLE AND BETAMETHASONE DIPROPIONATE 10; .64 MG/G; MG/G
CREAM TOPICAL 2 TIMES DAILY
Qty: 45 G | Refills: 3 | Status: SHIPPED | OUTPATIENT
Start: 2021-07-19

## 2021-07-21 ENCOUNTER — PATIENT MESSAGE (OUTPATIENT)
Dept: INTERNAL MEDICINE CLINIC | Age: 81
End: 2021-07-21

## 2022-01-19 ENCOUNTER — TELEPHONE (OUTPATIENT)
Dept: INTERNAL MEDICINE CLINIC | Age: 82
End: 2022-01-19

## 2022-01-19 DIAGNOSIS — J06.9 UPPER RESPIRATORY TRACT INFECTION, UNSPECIFIED TYPE: Primary | ICD-10-CM

## 2022-01-19 DIAGNOSIS — J40 BRONCHITIS: ICD-10-CM

## 2022-01-19 RX ORDER — AZITHROMYCIN 250 MG/1
250 TABLET, FILM COATED ORAL SEE ADMIN INSTRUCTIONS
Qty: 6 TABLET | Refills: 0 | Status: SHIPPED | OUTPATIENT
Start: 2022-01-19 | End: 2022-01-24

## 2022-01-20 NOTE — TELEPHONE ENCOUNTER
On-call message: I received a call from Mr. Noelle Kim and his wife. He is having productive cough with a temperature of 100.2. He denies any shortness of breath. He reports that he has been ill since January 4, 2022. His wife reports that he refuses to have a COVID test.  A prescription for a Z-Hernan was sent to his pharmacy.

## 2022-01-21 ENCOUNTER — TELEPHONE (OUTPATIENT)
Dept: INTERNAL MEDICINE CLINIC | Age: 82
End: 2022-01-21

## 2022-01-21 RX ORDER — BENZONATATE 200 MG/1
200 CAPSULE ORAL
Qty: 21 CAPSULE | Refills: 0 | Status: SHIPPED | OUTPATIENT
Start: 2022-01-21 | End: 2022-01-28

## 2022-01-21 NOTE — TELEPHONE ENCOUNTER
Patient has been  sick since Jan 05 , flu symptoms , temp goes up in evening , coughing up possible phlegm? Wants to know if we can send something in for him.

## 2022-01-21 NOTE — TELEPHONE ENCOUNTER
Please advise patient that he should be COVID tested. I do see that Dr. Ronn Danielle sent in a Z-Hernan for him on 1/19 in case he had any bacterial process. If he is not improving on the antibiotic then this is a virus and treatment for virus is supportive. Increase fluids and rest.  Take Mucinex to break up the congestion. I am sending in [facility] for cough.

## 2022-03-20 PROBLEM — B35.6 TINEA CRURIS: Status: ACTIVE | Noted: 2020-01-14

## 2022-03-20 PROBLEM — D69.6 THROMBOCYTOPENIA (HCC): Status: ACTIVE | Noted: 2020-01-14

## 2022-03-20 NOTE — PROGRESS NOTES
Neno Rosales is a [de-identified] y.o. male who presents for follow up. Medicare wellness done 2021. Reports constipation and straining at times, Over the past 6 months. Reports he tried metamucil once a day for 2-3 weeks, no change. Colace, no change. Dulcolax as needed. Weight loss intentional 10#. Colonoscopy, normal, 2019. Dr. Kathy Gil.      Followed by cardiology, Dr. Sybil Scherer, [facility].  History of CAD with prior CABG .  Cath in  with patent grafts. He is statin intolerant.       He is able to tolerate aspirin 81mg three days a week. Stopped daily use due to bruising. Active routinely, walking every daily. No symptoms with exertion.       Saw Dr. Larose Schwab, in the past ( he does not do urolift). for BPH. PSA stable, 2019.  stopped PSA screening.   No change in urination.  Up once at night. Prior discussion regarding urolift procedure, no surgery planned now.  He is considering the surgery in the future.      Due for derm, Dr Best.  Has actinic keratosis, no skin cancer.       Up to date on eye exam. Dr. Marta Morel.        Past Medical History:   Diagnosis Date    Actinic keratosis, hx of     Actinic Keratoses Head, limbs    Allergic rhinitis     Benign prostatic hypertrophy     CAD (coronary artery disease)     anterior MI ;  by cath 2008    Hypercholesterolemia     Amando-Parkinson-White (WPW) syndrome 1973    status post ablation       Family History   Problem Relation Age of Onset    Cancer Father 71        Brain    Other Father         Cholitis    Cancer Mother 80        Pancreatic CA    Heart Disease Brother     Heart Attack Brother         at least 3 (first in his 45s)    Stroke Maternal Grandmother     No Known Problems Brother     No Known Problems Son     No Known Problems Son     No Known Problems Daughter     Asthma Paternal Grandmother     Cancer Paternal Uncle     Heart Disease Maternal Grandfather        Social History     Socioeconomic History    Marital status:      Spouse name: Not on file    Number of children: Not on file    Years of education: Not on file    Highest education level: Not on file   Occupational History    Not on file   Tobacco Use    Smoking status: Never Smoker    Smokeless tobacco: Never Used   Substance and Sexual Activity    Alcohol use: No    Drug use: No    Sexual activity: Not Currently     Partners: Female   Other Topics Concern    Not on file   Social History Narrative    Not on file     Social Determinants of Health     Financial Resource Strain:     Difficulty of Paying Living Expenses: Not on file   Food Insecurity:     Worried About Running Out of Food in the Last Year: Not on file    [place] of Food in the Last Year: Not on file   Transportation Needs:     Lack of Transportation (Medical): Not on file    Lack of Transportation (Non-Medical):  Not on file   Physical Activity:     Days of Exercise per Week: Not on file    Minutes of Exercise per Session: Not on file   Stress:     Feeling of Stress : Not on file   Social Connections:     Frequency of Communication with Friends and Family: Not on file    Frequency of Social Gatherings with Friends and Family: Not on file    Attends Mandaeism Services: Not on file    Active Member of [address] [place] [address] [employer] or Organizations: Not on file    Attends Club or Organization Meetings: Not on file    Marital Status: Not on file   Intimate Partner Violence:     Fear of Current or Ex-Partner: Not on file    Emotionally Abused: Not on file    Physically Abused: Not on file    Sexually Abused: Not on file   Housing Stability:     Unable to Pay for Housing in the Last Year: Not on file    Number of [place] in the Last Year: Not on file    Unstable Housing in the Last Year: Not on file       Current Outpatient Medications on File Prior to Visit   Medication Sig Dispense Refill    OTHER Bausch and lomb eye drops as needed   Soothe  multivitamin (ONE A DAY) tablet Take 1 Tablet by mouth daily.  clotrimazole-betamethasone (LOTRISONE) topical cream Apply  to affected area two (2) times a day. As needed. 45 g 3    ketoconazole (NIZORAL) 2 % topical cream Apply  to affected area two (2) times daily as needed (for jock itch). 60 g 5    aspirin delayed-release 81 mg tablet Take 81 mg by mouth every Monday, Wednesday, Friday.  [DISCONTINUED] ipratropium (ATROVENT) 0.03 % nasal spray USE 1 TO 2 SPRAYS NASALLY THREE TIMES DAILY AS NEEDED 60 mL 3    carboxymethylcellulose sodium (REFRESH OP) Apply  to eye. (Patient not taking: Reported on 3/24/2022)       No current facility-administered medications on file prior to visit. Review of Systems  Pertinent items are noted in HPI. Objective:     Visit Vitals  /74 (BP 1 Location: Left arm, BP Patient Position: Sitting, BP Cuff Size: Adult)   Pulse 60   Temp 98 °F (36.7 °C) (Temporal)   Resp 16   Ht 5' 10\" (1.778 m)   Wt 166 lb (75.3 kg)   SpO2 100%   BMI 23.82 kg/m²     Gen: well appearing male  HEENT:   PERRL,normal conjunctiva. External ear and canals normal, TMs no opacification or erythema,  OP no erythema, no exudates, MMM  Neck:  Supple. Thyroid normal size, nontender, without nodules. No masses or LAD  Resp:  No wheezing, no rhonchi, no rales. CV:  RRR, normal S1S2, no murmur. GI: soft, nontender, without masses. No hepatosplenomegaly. Extrem:  +2 pulses, no edema, warm distally      Assessment/Plan:       ICD-10-CM ICD-9-CM    1. Constipation, unspecified constipation type  K59.00 564.00    2. Coronary artery disease due to lipid rich plaque  I25.10 414.00     I25.83 414.3    3. Benign prostatic hyperplasia without lower urinary tract symptoms  N40.0 600.00    4. Hypercholesterolemia  E78.00 272.0    5. Statin intolerance  Z78.9 995.27    6.  Vasomotor rhinitis  J30.0 477.9 ipratropium (ATROVENT) 21 mcg (0.03 %) nasal spray     Given information on high-fiber diet, increase fluids, MiraLAX if no BM for 2 days. Follow-up with Dr. Aniceto Lim if needed. Aquaphor recommended for foot condition.       Agatha Ferro MD

## 2022-03-24 ENCOUNTER — OFFICE VISIT (OUTPATIENT)
Dept: INTERNAL MEDICINE CLINIC | Age: 82
End: 2022-03-24
Payer: MEDICARE

## 2022-03-24 VITALS
SYSTOLIC BLOOD PRESSURE: 129 MMHG | WEIGHT: 166 LBS | HEART RATE: 60 BPM | HEIGHT: 70 IN | TEMPERATURE: 98 F | RESPIRATION RATE: 16 BRPM | OXYGEN SATURATION: 100 % | BODY MASS INDEX: 23.77 KG/M2 | DIASTOLIC BLOOD PRESSURE: 74 MMHG

## 2022-03-24 DIAGNOSIS — N40.0 BENIGN PROSTATIC HYPERPLASIA WITHOUT LOWER URINARY TRACT SYMPTOMS: ICD-10-CM

## 2022-03-24 DIAGNOSIS — Z78.9 STATIN INTOLERANCE: ICD-10-CM

## 2022-03-24 DIAGNOSIS — K59.00 CONSTIPATION, UNSPECIFIED CONSTIPATION TYPE: Primary | ICD-10-CM

## 2022-03-24 DIAGNOSIS — I25.10 CORONARY ARTERY DISEASE DUE TO LIPID RICH PLAQUE: ICD-10-CM

## 2022-03-24 DIAGNOSIS — J30.0 VASOMOTOR RHINITIS: ICD-10-CM

## 2022-03-24 DIAGNOSIS — I25.83 CORONARY ARTERY DISEASE DUE TO LIPID RICH PLAQUE: ICD-10-CM

## 2022-03-24 DIAGNOSIS — E78.00 HYPERCHOLESTEROLEMIA: ICD-10-CM

## 2022-03-24 PROBLEM — D69.6 THROMBOCYTOPENIA (HCC): Status: RESOLVED | Noted: 2020-01-14 | Resolved: 2022-03-24

## 2022-03-24 PROCEDURE — G8420 CALC BMI NORM PARAMETERS: HCPCS | Performed by: FAMILY MEDICINE

## 2022-03-24 PROCEDURE — G8432 DEP SCR NOT DOC, RNG: HCPCS | Performed by: FAMILY MEDICINE

## 2022-03-24 PROCEDURE — 99214 OFFICE O/P EST MOD 30 MIN: CPT | Performed by: FAMILY MEDICINE

## 2022-03-24 PROCEDURE — 1101F PT FALLS ASSESS-DOCD LE1/YR: CPT | Performed by: FAMILY MEDICINE

## 2022-03-24 PROCEDURE — G8427 DOCREV CUR MEDS BY ELIG CLIN: HCPCS | Performed by: FAMILY MEDICINE

## 2022-03-24 PROCEDURE — G8536 NO DOC ELDER MAL SCRN: HCPCS | Performed by: FAMILY MEDICINE

## 2022-03-24 RX ORDER — BISMUTH SUBSALICYLATE 262 MG
1 TABLET,CHEWABLE ORAL DAILY
COMMUNITY
End: 2022-08-01

## 2022-03-24 RX ORDER — IPRATROPIUM BROMIDE 21 UG/1
SPRAY, METERED NASAL
Qty: 90 ML | Refills: 3 | Status: SHIPPED | OUTPATIENT
Start: 2022-03-24 | End: 2022-08-01 | Stop reason: SDUPTHER

## 2022-03-24 NOTE — PROGRESS NOTES
Em.1. Have you been to the ER, urgent care clinic since your last visit? Hospitalized since your last visit? No    2. Have you seen or consulted any other health care providers outside of the [address] [place] [address] since your last visit? Include any pap smears or colon screening.  No       Em lpn

## 2022-03-24 NOTE — PATIENT INSTRUCTIONS
Increase water intake to 6-8 glasses a day, add 30 grams of fiber to diet- fiber gummies, whole grain bread or cereal, oatmeal.       If no BM for 2 days, use miralax laxative as needed. Can see Dr Pia Mendez, GI, if not resolving. High-Fiber Diet: Care Instructions  Overview     A high-fiber diet may help you relieve constipation and feel less bloated. Your doctor and dietitian will help you make a high-fiber eating plan based on your personal needs. The plan will include the things you like to eat. It will also make sure that you get 25 to 35 grams of fiber a day. Before you make changes to the way you eat, be sure to talk with your doctor or dietitian. Follow-up care is a key part of your treatment and safety. Be sure to make and go to all appointments, and call your doctor if you are having problems. It's also a good idea to know your test results and keep a list of the medicines you take. How can you care for yourself at home? · You can increase how much fiber you get if you eat more of certain foods. These foods include:  ? Whole-grain breads and cereals. ? Fruits, such as pears, apples, and peaches. Eat the skins and peels if you can.  ? Vegetables, such as broccoli, cabbage, spinach, carrots, asparagus, and squash. ? Starchy vegetables. These include potatoes with skins, kidney beans, and lima beans. · Take a fiber supplement every day if your doctor recommends it. Examples are Benefiber, Citrucel, FiberCon, and Metamucil. Ask your doctor how much to take. · Drink plenty of fluids. If you have kidney, heart, or liver disease and have to limit fluids, talk with your doctor before you increase the amount of fluids you drink. Where can you learn more? Go to http://www.gray.com/  Enter R721 in the search box to learn more about \"High-Fiber Diet: Care Instructions. \"  Current as of: September 8, 2021               Content Version: 13.2  © 3663-8837 [employer]. Care instructions adapted under license by [employer] (which disclaims liability or warranty for this information). If you have questions about a medical condition or this instruction, always ask your healthcare professional. Mehreen Pearl any warranty or liability for your use of this information.     AQUAPHOR OINTMENT TO FEET AT BEDTIME    Louis Duque MD Physician Gastroenterology 07/09/2014 End  7/9/14   Phone: [phone]

## 2022-08-01 ENCOUNTER — VIRTUAL VISIT (OUTPATIENT)
Dept: INTERNAL MEDICINE CLINIC | Age: 82
End: 2022-08-01
Payer: MEDICARE

## 2022-08-01 DIAGNOSIS — I25.10 CORONARY ARTERY DISEASE DUE TO LIPID RICH PLAQUE: Primary | ICD-10-CM

## 2022-08-01 DIAGNOSIS — N40.0 BENIGN PROSTATIC HYPERPLASIA WITHOUT LOWER URINARY TRACT SYMPTOMS: ICD-10-CM

## 2022-08-01 DIAGNOSIS — I25.83 CORONARY ARTERY DISEASE DUE TO LIPID RICH PLAQUE: Primary | ICD-10-CM

## 2022-08-01 DIAGNOSIS — Z13.31 SCREENING FOR DEPRESSION: ICD-10-CM

## 2022-08-01 DIAGNOSIS — J30.0 VASOMOTOR RHINITIS: ICD-10-CM

## 2022-08-01 DIAGNOSIS — Z78.9 STATIN INTOLERANCE: ICD-10-CM

## 2022-08-01 DIAGNOSIS — Z00.00 MEDICARE ANNUAL WELLNESS VISIT, SUBSEQUENT: ICD-10-CM

## 2022-08-01 PROCEDURE — 1101F PT FALLS ASSESS-DOCD LE1/YR: CPT | Performed by: FAMILY MEDICINE

## 2022-08-01 PROCEDURE — G0439 PPPS, SUBSEQ VISIT: HCPCS | Performed by: FAMILY MEDICINE

## 2022-08-01 PROCEDURE — G8420 CALC BMI NORM PARAMETERS: HCPCS | Performed by: FAMILY MEDICINE

## 2022-08-01 PROCEDURE — 99214 OFFICE O/P EST MOD 30 MIN: CPT | Performed by: FAMILY MEDICINE

## 2022-08-01 PROCEDURE — G8510 SCR DEP NEG, NO PLAN REQD: HCPCS | Performed by: FAMILY MEDICINE

## 2022-08-01 PROCEDURE — G8536 NO DOC ELDER MAL SCRN: HCPCS | Performed by: FAMILY MEDICINE

## 2022-08-01 PROCEDURE — G8427 DOCREV CUR MEDS BY ELIG CLIN: HCPCS | Performed by: FAMILY MEDICINE

## 2022-08-01 RX ORDER — IPRATROPIUM BROMIDE 21 UG/1
SPRAY, METERED NASAL
Qty: 60 ML | Refills: 3
Start: 2022-08-01

## 2022-08-01 NOTE — PROGRESS NOTES
Anna Gee is a 81 y.o. male who presents for follow-up. Patient was last seen March 2022. When last seen was having problems with constipation. Reports better now. Followed by Dr. Taylor Colon. Colonoscopy, normal, Feb 2019. Followed by cardiology, Dr. Constantino Lima, annually. History of WPW. History of CAD with prior CABG 2006. Cath in 2008 with patent grafts. Active routinely, walking 1/2 mile every daily and 2-3 days lifting weights. No symptoms with exertion. Off aspirin. He is statin intolerant. Saw Dr. Malinda Pope, urology, in the past ( he does not do urolift). for BPH. PSA stable, January 2019. stopped PSA screening. No change in urination. Up once at night. Prior discussion regarding urolift procedure, no surgery planned now. Due for derm, Dr Robert Gutierrez. Has actinic keratosis, no skin cancer. Up to date on eye exam. Dr. Lu Smith. Reports was having headaches left retro-orbital. Noted change in visual quality. Occurred one month ago. Using atrovent nasal with relief of symptoms. Normal DEXA scan 2019. This is an established visit conducted via telemedicine with video. The patient has been instructed that this meets HIPAA criteria and acknowledges and agrees to this method of visitation. Pursuant to the emergency declaration under the [facility], [facility]5 waiver authority and the [employer] General Act, this Virtual Visit was conducted, with patient's consent, to reduce the patient's risk of exposure to COVID-19 and provide continuity of care for an established patient. Services were provided through a video synchronous discussion virtually to substitute for in-person clinic visit.         Past Medical History:   Diagnosis Date    Actinic keratosis, hx of     Actinic Keratoses Head, limbs    Allergic rhinitis     Benign prostatic hypertrophy     CAD (coronary artery disease) anterior MI ;  by cath 2008    Hypercholesterolemia     Amando-Parkinson-White (WPW) syndrome 1973    status post ablation       Family History   Problem Relation Age of Onset    Cancer Father 71        Brain    Other Father         Cholitis    Cancer Mother 80        Pancreatic CA    Heart Disease Brother     Heart Attack Brother         at least 3 (first in his 45s)    Stroke Maternal Grandmother     No Known Problems Brother     No Known Problems Son     No Known Problems Son     No Known Problems Daughter     Asthma Paternal Grandmother     Cancer Paternal Uncle     Heart Disease Maternal Grandfather        Social History     Socioeconomic History    Marital status:      Spouse name: Not on file    Number of children: Not on file    Years of education: Not on file    Highest education level: Not on file   Occupational History    Not on file   Tobacco Use    Smoking status: Never    Smokeless tobacco: Never   Substance and Sexual Activity    Alcohol use: No    Drug use: No    Sexual activity: Not Currently     Partners: Female   Other Topics Concern    Not on file   Social History Narrative    Not on file     Social Determinants of Health     Financial Resource Strain: Not on file   Food Insecurity: Not on file   Transportation Needs: Not on file   Physical Activity: Not on file   Stress: Not on file   Social Connections: Not on file   Intimate Partner Violence: Not on file   Housing Stability: Not on file       Current Outpatient Medications on File Prior to Visit   Medication Sig Dispense Refill    clotrimazole-betamethasone (LOTRISONE) topical cream Apply  to affected area two (2) times a day. As needed. 45 g 3    ketoconazole (NIZORAL) 2 % topical cream Apply  to affected area two (2) times daily as needed (for jock itch). 60 g 5    [DISCONTINUED] OTHER Bausch and lomb eye drops as needed   Soothe      [DISCONTINUED] multivitamin (ONE A DAY) tablet Take 1 Tablet by mouth daily. [DISCONTINUED] ipratropium (ATROVENT) 21 mcg (0.03 %) nasal spray USE 1 TO 2 SPRAYS NASALLY THREE TIMES DAILY AS NEEDED 90 mL 3    [DISCONTINUED] carboxymethylcellulose sodium (REFRESH OP) Apply  to eye. (Patient not taking: Reported on 3/24/2022)      [DISCONTINUED] aspirin delayed-release 81 mg tablet Take 81 mg by mouth every Monday, Wednesday, Friday. (Patient not taking: Reported on 8/1/2022)       No current facility-administered medications on file prior to visit. Review of Systems  Pertinent items are noted in HPI. Objective:     Gen: well appearing male  HEENT: normal conjunctiva, no audible congestion, patient does not see oral erythema, has MMM  Neck: patient does not feel enlarged or tender LAD or masses  Resp: normal respiratory effort, no audible wheezing. CV: patient does not feel palpitations or heart irregularity  Abd: patient does not feel abdominal tenderness or mass, patient does not notice distension  Extrem: patient does not see swelling in ankles or joints. Neuro: Alert and oriented, able to answer questions without difficulty, able to move all extremities and walk normally        Assessment/Plan:       ICD-10-CM ICD-9-CM    1. Coronary artery disease due to lipid rich plaque  Q16.85 799.59 METABOLIC PANEL, COMPREHENSIVE    I25.83 414.3 CBC W/O DIFF      LIPID PANEL      2. Medicare annual wellness visit, subsequent  Z00.00 V70.0       3. Benign prostatic hyperplasia without lower urinary tract symptoms  N40.0 600.00       4. Statin intolerance  Z78.9 995.27       5. Screening for depression  Z13.31 V79.0 DEPRESSION SCREEN ANNUAL      6. Vasomotor rhinitis  J30.0 477.9 ipratropium (ATROVENT) 21 mcg (0.03 %) nasal spray      Recommended carotid ultrasound, patient wishes to defer. Not on aspirin or statin. To schedule with cardiology. This was a telemedicine visit with video.         Carmen Cain MD           This is the Subsequent Medicare Annual Wellness Exam, performed 12 months or more after the Initial AWV or the last Subsequent AWV    I have reviewed the patient's medical history in detail and updated the computerized patient record. Assessment/Plan   Education and counseling provided:  Are appropriate based on today's review and evaluation    1. Coronary artery disease due to lipid rich plaque  -     METABOLIC PANEL, COMPREHENSIVE; Future  -     CBC W/O DIFF; Future  -     LIPID PANEL; Future  2. Medicare annual wellness visit, subsequent  3. Benign prostatic hyperplasia without lower urinary tract symptoms  4. Statin intolerance  5. Screening for depression  -     DEPRESSION SCREEN ANNUAL  6. Vasomotor rhinitis  -     ipratropium (ATROVENT) 21 mcg (0.03 %) nasal spray; USE 1 TO 2 SPRAYS NASALLY THREE TIMES DAILY AS NEEDED, No Print, Disp-60 mL, R-3     Depression Risk Factor Screening     3 most recent PHQ Screens 8/1/2022   Little interest or pleasure in doing things Not at all   Feeling down, depressed, irritable, or hopeless Not at all   Total Score PHQ 2 0       Alcohol & Drug Abuse Risk Screen    Do you average more than 1 drink per night or more than 7 drinks a week: No    In the past three months have you have had more than 4 drinks containing alcohol on one occasion: No          Functional Ability and Level of Safety    Hearing: Hearing is good. Activities of Daily Living: The home contains: no safety equipment. Patient does total self care      Ambulation: with no difficulty     Fall Risk:  Fall Risk Assessment, last 12 mths 8/1/2022   Able to walk? Yes   Fall in past 12 months? 0   Do you feel unsteady? 0   Are you worried about falling 0   Is the gait abnormal? -   Number of falls in past 12 months -   Fall with injury?  -      Abuse Screen:  Patient is not abused       Cognitive Screening    Has your family/caregiver stated any concerns about your memory: no     Cognitive Screening: Normal - Verbal Fluency Test    Health Maintenance Due     Health Maintenance Due   Topic Date Due    Depression Screen  Never done    COVID-19 Vaccine (1) Never done    Shingrix Vaccine Age 49> (1 of 2) Never done       Patient Care Team   Patient Care Team:  Caryle Hind, MD as PCP - General (Internal Medicine Physician)  Caryle Hind, MD as PCP - [facility]  Deryl Hodgkin, MD as Physician (Cardiovascular Disease Physician)  Deidra Araujo MD as Physician (Gastroenterology)  Dr. Hamlet Ramos (Optometry)  Sarah Ward MD as Physician (Dermatology Physician)  Jena Benjamin MD as Physician (Ophthalmology)  Jennifer Whittaker MD (Urology)    History     Patient Active Problem List   Diagnosis Code    CAD (coronary artery disease) I25.10    Hypercholesterolemia E78.00    Benign prostatic hyperplasia without lower urinary tract symptoms N40.0    Allergic rhinitis J30.9    Musculoskeletal neck pain M54.2    S/P CABG x 4 Z95.1    Vasomotor rhinitis J30.0    Chronic right hip pain M25.551, G89.29    Actinic keratoses L57.0    Elevated blood pressure reading without diagnosis of hypertension R03.0    Benign colon polyp K63.5    Statin intolerance Z78.9    Primary insomnia F51.01    Tinea cruris B35.6     Past Medical History:   Diagnosis Date    Actinic keratosis, hx of     Actinic Keratoses Head, limbs    Allergic rhinitis     Benign prostatic hypertrophy     CAD (coronary artery disease)     anterior MI ;  by cath 2008    Hypercholesterolemia     Amando-Parkinson-White (WPW) syndrome 1973    status post ablation      Past Surgical History:   Procedure Laterality Date    COLONOSCOPY N/A 2019    COLONOSCOPY performed by Deidra Araujo MD at [address]; HI RISK IND  2019         ENDOSCOPY, COLON, DIAGNOSTIC  2004    neg; 10 years; Dr. Pascual Freeman    HX COLONOSCOPY  14    Repeat in 5 years    HX HEENT Left     left eye surgery (Román Lock on the eye)    HX ORTHOPAEDIC meniscus repair (unknown which side)    Rafaela Mcconnell    VT CABG, ARTERY-VEIN, FOUR  2006    x 4    VT CARDIAC SURG PROCEDURE UNLIST  2004, 2006    VT COLON CA SCRN NOT  [address]  IND  02/04/2014    Treating by dermatologist     Current Outpatient Medications   Medication Sig Dispense Refill    ipratropium (ATROVENT) 21 mcg (0.03 %) nasal spray USE 1 TO 2 SPRAYS NASALLY THREE TIMES DAILY AS NEEDED 60 mL 3    clotrimazole-betamethasone (LOTRISONE) topical cream Apply  to affected area two (2) times a day. As needed. 45 g 3    ketoconazole (NIZORAL) 2 % topical cream Apply  to affected area two (2) times daily as needed (for jock itch).  60 g 5     Allergies   Allergen Reactions    Amoxicillin Hives    Atorvastatin Other (comments)     Felt bad; constipation; urine dark but no myalgia    Pravastatin Other (comments)     Difficulty raising right toe and issues walking    Simvastatin Myalgia       Family History   Problem Relation Age of Onset    Cancer Father 71        Brain    Other Father         Cholitis    Cancer Mother 80        Pancreatic CA    Heart Disease Brother     Heart Attack Brother         at least 3 (first in his 45s)    Stroke Maternal Grandmother     No Known Problems Brother     No Known Problems Son     No Known Problems Son     No Known Problems Daughter     Asthma Paternal Grandmother     Cancer Paternal Uncle     Heart Disease Maternal Grandfather      Social History     Tobacco Use    Smoking status: Never    Smokeless tobacco: Never   Substance Use Topics    Alcohol use: No         Cherelle Carpenter MD

## 2022-08-01 NOTE — PROGRESS NOTES
Rm    Chief Complaint   Patient presents with    Annual Wellness Visit        There were no vitals taken for this visit. 1. Have you been to the ER, urgent care clinic since your last visit? Hospitalized since your last visit? No    2. Have you seen or consulted any other health care providers outside of the [address] [place] [address] since your last visit? Include any pap smears or colon screening. No     Health Maintenance Due   Topic Date Due    Depression Screen  Never done    COVID-19 Vaccine (1) Never done    Shingrix Vaccine Age 50> (1 of 2) Never done    Medicare Yearly Exam  07/20/2022        3 most recent PHQ Screens 8/1/2022   Little interest or pleasure in doing things Not at all   Feeling down, depressed, irritable, or hopeless Not at all   Total Score PHQ 2 0        Fall Risk Assessment, last 12 mths 8/1/2022   Able to walk? Yes   Fall in past 12 months? 0   Do you feel unsteady? 0   Are you worried about falling 0   Is the gait abnormal? -   Number of falls in past 12 months -   Fall with injury?  -       Learning Assessment 7/12/2017   PRIMARY LEARNER Patient   HIGHEST LEVEL OF EDUCATION - PRIMARY LEARNER  SOME COLLEGE   BARRIERS PRIMARY LEARNER NONE   CO-LEARNER CAREGIVER No   PRIMARY LANGUAGE ENGLISH   LEARNER PREFERENCE PRIMARY READING   ANSWERED BY patient   RELATIONSHIP SELF

## 2022-08-01 NOTE — PATIENT INSTRUCTIONS
Medicare Wellness Visit, Male    The best way to live healthy is to have a lifestyle where you eat a well-balanced diet, exercise regularly, limit alcohol use, and quit all forms of tobacco/nicotine, if applicable. Regular preventive services are another way to keep healthy. Preventive services (vaccines, screening tests, monitoring & exams) can help personalize your care plan, which helps you manage your own care. Screening tests can find health problems at the earliest stages, when they are easiest to treat. [facility] follows the current, evidence-based guidelines published by the [facility] ([facility]) when recommending preventive services for our patients. Because we follow these guidelines, sometimes recommendations change over time as research supports it. (For example, a prostate screening blood test is no longer routinely recommended for men with no symptoms). Of course, you and your doctor may decide to screen more often for some diseases, based on your risk and co-morbidities (chronic disease you are already diagnosed with). Preventive services for you include:  - Medicare offers their members a free annual wellness visit, which is time for you and your primary care provider to discuss and plan for your preventive service needs. Take advantage of this benefit every year!  -All adults over age 72 should receive the recommended pneumonia vaccines. Current USPSTF guidelines recommend a series of two vaccines for the best pneumonia protection.   -All adults should have a flu vaccine yearly and tetanus vaccine every 10 years.  -All adults age 48 and older should receive the shingles vaccines (series of two vaccines).        -All adults age 38-68 who are overweight should have a diabetes screening test once every three years.   -Other screening tests & preventive services for persons with diabetes include: an eye exam to screen for diabetic retinopathy, a kidney function test, a foot exam, and stricter control over your cholesterol.   -Cardiovascular screening for adults with routine risk involves an electrocardiogram (ECG) at intervals determined by the provider.   -Colorectal cancer screening should be done for adults age 54-65 with no increased risk factors for colorectal cancer. There are a number of acceptable methods of screening for this type of cancer. Each test has its own benefits and drawbacks. Discuss with your provider what is most appropriate for you during your annual wellness visit. The different tests include: colonoscopy (considered the best screening method), a fecal occult blood test, a fecal DNA test, and sigmoidoscopy.  -All adults born between [facility] should be screened once for Hepatitis C.  -An Abdominal Aortic Aneurysm (AAA) Screening is recommended for men age 73-68 who has ever smoked in their lifetime. Medicare Wellness Visit, Male    The best way to live healthy is to have a lifestyle where you eat a well-balanced diet, exercise regularly, limit alcohol use, and quit all forms of tobacco/nicotine, if applicable. Regular preventive services are another way to keep healthy. Preventive services (vaccines, screening tests, monitoring & exams) can help personalize your care plan, which helps you manage your own care. Screening tests can find health problems at the earliest stages, when they are easiest to treat. [facility] follows the current, evidence-based guidelines published by the [place] [facility] (USPSTF) when recommending preventive services for our patients. Because we follow these guidelines, sometimes recommendations change over time as research supports it. (For example, a prostate screening blood test is no longer routinely recommended for men with no symptoms).   Of course, you and your doctor may decide to screen more often for some diseases, based on your risk and co-morbidities (chronic disease you are already diagnosed with). Preventive services for you include:  - Medicare offers their members a free annual wellness visit, which is time for you and your primary care provider to discuss and plan for your preventive service needs. Take advantage of this benefit every year!  -All adults over age 72 should receive the recommended pneumonia vaccines. Current USPSTF guidelines recommend a series of two vaccines for the best pneumonia protection.   -All adults should have a flu vaccine yearly and tetanus vaccine every 10 years.  -All adults age 48 and older should receive the shingles vaccines (series of two vaccines). -All adults age 38-68 who are overweight should have a diabetes screening test once every three years.   -Other screening tests & preventive services for persons with diabetes include: an eye exam to screen for diabetic retinopathy, a kidney function test, a foot exam, and stricter control over your cholesterol.   -Cardiovascular screening for adults with routine risk involves an electrocardiogram (ECG) at intervals determined by the provider.   -Colorectal cancer screening should be done for adults age 54-65 with no increased risk factors for colorectal cancer. There are a number of acceptable methods of screening for this type of cancer. Each test has its own benefits and drawbacks. Discuss with your provider what is most appropriate for you during your annual wellness visit. The different tests include: colonoscopy (considered the best screening method), a fecal occult blood test, a fecal DNA test, and sigmoidoscopy.  -All adults born between [facility] should be screened once for Hepatitis C.  -An Abdominal Aortic Aneurysm (AAA) Screening is recommended for men age 73-68 who has ever smoked in their lifetime.      Here is a list of your current Health Maintenance items (your personalized list of preventive services) with a due date:  Health Maintenance Due   Topic Date Due    Depresssion Screening  Never done    COVID-19 Vaccine (1) Never done    Shingles Vaccine (1 of 2) Never done

## 2022-08-02 ENCOUNTER — APPOINTMENT (OUTPATIENT)
Dept: INTERNAL MEDICINE CLINIC | Age: 82
End: 2022-08-02

## 2022-08-02 DIAGNOSIS — I25.10 CORONARY ARTERY DISEASE DUE TO LIPID RICH PLAQUE: ICD-10-CM

## 2022-08-02 DIAGNOSIS — I25.83 CORONARY ARTERY DISEASE DUE TO LIPID RICH PLAQUE: ICD-10-CM

## 2022-08-03 LAB
ALBUMIN SERPL-MCNC: 4 G/DL (ref 3.5–5)
ALBUMIN/GLOB SERPL: 1.5 {RATIO} (ref 1.1–2.2)
ALP SERPL-CCNC: 104 U/L (ref 45–117)
ALT SERPL-CCNC: 22 U/L (ref 12–78)
ANION GAP SERPL CALC-SCNC: 6 MMOL/L (ref 5–15)
AST SERPL-CCNC: 16 U/L (ref 15–37)
BILIRUB SERPL-MCNC: 0.7 MG/DL (ref 0.2–1)
BUN SERPL-MCNC: 13 MG/DL (ref 6–20)
BUN/CREAT SERPL: 13 (ref 12–20)
CALCIUM SERPL-MCNC: 9.2 MG/DL (ref 8.5–10.1)
CHLORIDE SERPL-SCNC: 108 MMOL/L (ref 97–108)
CHOLEST SERPL-MCNC: 144 MG/DL
CO2 SERPL-SCNC: 28 MMOL/L (ref 21–32)
CREAT SERPL-MCNC: 1.02 MG/DL (ref 0.7–1.3)
ERYTHROCYTE [DISTWIDTH] IN BLOOD BY AUTOMATED COUNT: 13.2 % (ref 11.5–14.5)
GLOBULIN SER CALC-MCNC: 2.7 G/DL (ref 2–4)
GLUCOSE SERPL-MCNC: 90 MG/DL (ref 65–100)
HCT VFR BLD AUTO: 46.2 % (ref 36.6–50.3)
HDLC SERPL-MCNC: 47 MG/DL
HDLC SERPL: 3.1 {RATIO} (ref 0–5)
HGB BLD-MCNC: 14.9 G/DL (ref 12.1–17)
LDLC SERPL CALC-MCNC: 84.6 MG/DL (ref 0–100)
MCH RBC QN AUTO: 30.6 PG (ref 26–34)
MCHC RBC AUTO-ENTMCNC: 32.3 G/DL (ref 30–36.5)
MCV RBC AUTO: 94.9 FL (ref 80–99)
NRBC # BLD: 0 K/UL (ref 0–0.01)
NRBC BLD-RTO: 0 PER 100 WBC
PLATELET # BLD AUTO: 140 K/UL (ref 150–400)
PMV BLD AUTO: 10.8 FL (ref 8.9–12.9)
POTASSIUM SERPL-SCNC: 4.8 MMOL/L (ref 3.5–5.1)
PROT SERPL-MCNC: 6.7 G/DL (ref 6.4–8.2)
RBC # BLD AUTO: 4.87 M/UL (ref 4.1–5.7)
SODIUM SERPL-SCNC: 142 MMOL/L (ref 136–145)
TRIGL SERPL-MCNC: 62 MG/DL (ref ?–150)
VLDLC SERPL CALC-MCNC: 12.4 MG/DL
WBC # BLD AUTO: 4.1 K/UL (ref 4.1–11.1)

## 2022-08-04 ENCOUNTER — PATIENT MESSAGE (OUTPATIENT)
Dept: INTERNAL MEDICINE CLINIC | Age: 82
End: 2022-08-04

## 2022-11-15 DIAGNOSIS — B35.6 TINEA CRURIS: ICD-10-CM

## 2022-11-15 RX ORDER — KETOCONAZOLE 20 MG/G
CREAM TOPICAL
Qty: 60 G | Refills: 5 | Status: SHIPPED | OUTPATIENT
Start: 2022-11-15 | End: 2022-11-16 | Stop reason: SDUPTHER

## 2022-11-15 RX ORDER — CLOTRIMAZOLE AND BETAMETHASONE DIPROPIONATE 10; .64 MG/G; MG/G
CREAM TOPICAL 2 TIMES DAILY
Qty: 45 G | Refills: 3 | Status: SHIPPED | OUTPATIENT
Start: 2022-11-15 | End: 2022-11-16 | Stop reason: SDUPTHER

## 2022-11-15 NOTE — TELEPHONE ENCOUNTER
----- Message from Monetta Cushing. Wickham sent at 11/15/2022  2:27 PM EST -----  [facility] Prescriptions Needed  Please mail to me the following 2 prescriptions:     1 - 60g tube of Ketoconazole cream 2%,  as needed, with refill   1 - 45g tube of Clotrimazole cream (1%) w/Betamethasone (.05%), as needed, with refill    Neha Saucer  [address] [facility]  [place], [address] [facility]    Thank you!

## 2022-11-15 NOTE — TELEPHONE ENCOUNTER
Future Appointments:  No future appointments. Last Appointment With Me:  2022     Requested Prescriptions     Pending Prescriptions Disp Refills    ketoconazole (NIZORAL) 2 % topical cream 60 g 5     Sig: Apply  to affected area two (2) times daily as needed (for jock itch). clotrimazole-betamethasone (LOTRISONE) topical cream 45 g 3     Sig: Apply  to affected area two (2) times a day. As needed. ----- Message from Sarah Miller sent at 11/15/2022  2:27 PM EST -----  [facility] Prescriptions Needed  Please mail to me the following 2 prescriptions:     1 - 60g tube of Ketoconazole cream 2%,  as needed, with refill   1 - 45g tube of Clotrimazole cream (1%) w/Betamethasone (.05%), as needed, with refill    [facility] [address], [address] [place] [address]    Thank you!

## 2022-11-16 DIAGNOSIS — B35.6 TINEA CRURIS: ICD-10-CM

## 2022-11-16 RX ORDER — CLOTRIMAZOLE AND BETAMETHASONE DIPROPIONATE 10; .64 MG/G; MG/G
CREAM TOPICAL 2 TIMES DAILY
Qty: 45 G | Refills: 3 | Status: SHIPPED | OUTPATIENT
Start: 2022-11-16

## 2022-11-16 RX ORDER — KETOCONAZOLE 20 MG/G
CREAM TOPICAL
Qty: 60 G | Refills: 5 | Status: SHIPPED | OUTPATIENT
Start: 2022-11-16

## 2022-11-16 NOTE — TELEPHONE ENCOUNTER
----- Message from Estiven Levi. Ayde Carter sent at 11/15/2022 10:25 PM EST -----  [facility] Prescriptions Needed  I had to call [facility] and cancel the 2 prescriptions you submitted to them. I requested that these prescriptions be MAILED to me as I can get them cheaper using [address] or [address] Plus, both were submitted for 15 gram tubes, and not for the larger sizes I requested!! PLEASE MAIL THEM TO ME, so I can price them locally before I go to get them filled! Please delete both Sherri, and Keno on [address] [facility]. as my choice for pharmacy providers, and replace them with 'mail all prescriptions to me' in the future, unless I specify a vendor. My original request follows:    Please mail to me the following 2 prescriptions:      1 - 60g tube of Ketoconazole cream 2%,  as needed, with refill   1 - 45g tube of Clotrimazole cream (1%) w/Betamethasone (.05%), as needed, with refill     Amy Pack  [address] [facility], [address] [facility]     Thank you!

## 2022-11-21 DIAGNOSIS — B35.6 TINEA CRURIS: ICD-10-CM

## 2022-11-21 RX ORDER — CLOTRIMAZOLE AND BETAMETHASONE DIPROPIONATE 10; .64 MG/G; MG/G
CREAM TOPICAL 2 TIMES DAILY
Qty: 45 G | Refills: 3 | Status: CANCELLED | OUTPATIENT
Start: 2022-11-21

## 2022-11-21 RX ORDER — KETOCONAZOLE 20 MG/G
CREAM TOPICAL
Qty: 60 G | Refills: 5 | Status: CANCELLED | OUTPATIENT
Start: 2022-11-21

## 2023-08-03 NOTE — PROGRESS NOTES
Lary Fairchild is a 81 y.o. male who presents for follow up. Followed by cardiology, Dr. Mirian Corley (ret). Is considering seeing Dr Rock Orr. History of WPW. History of CAD with prior CABG . Cath in  with patent grafts. He is statin intolerant. self stopped aspirin. Was on TIW aspirin. Active routinely, walking every daily. No symptoms with exertion. Problems with constipation. Drinking a lot of water, tried metamucil. Added probiotics, better. Prior colonoscopy, normal, 2019. Dr. Alex Kirkland. Saw Dr. Merlyn Carpenter, urology, in the past for BPH. PSA stable, 2019. stopped PSA screening. No change in urination. Nocturia x 1. Due for derm, Dr Musa Jefferson. has a new doctor. Has actinic keratosis, no skin cancer. Due for eye exam. Dr. Musa Jefferson.        Past Medical History:   Diagnosis Date    Actinic keratosis, hx of     Actinic Keratoses Head, limbs    Allergic rhinitis     Benign prostatic hypertrophy     CAD (coronary artery disease)     anterior MI ;  by cath 2008    Hypercholesterolemia     Lucien-Parkinson-White (WPW) syndrome 1973    status post ablation       Family History   Problem Relation Age of Onset    Cancer Father 71        Brain    Heart Disease Maternal Grandfather     Cancer Paternal Uncle     Asthma Paternal Grandmother     No Known Problems Daughter     No Known Problems Son     Heart Attack Brother         at least 3 (first in his 45s)    No Known Problems Brother     Heart Disease Brother     Stroke Maternal Grandmother     Cancer Mother 80        Pancreatic CA    Other Father         Cholitis    No Known Problems Son         Social History     Socioeconomic History    Marital status:      Spouse name: Not on file    Number of children: Not on file    Years of education: Not on file    Highest education level: Not on file   Occupational History    Not on file   Tobacco Use    Smoking status: Never    Smokeless tobacco: Never   Substance and

## 2023-08-04 ENCOUNTER — OFFICE VISIT (OUTPATIENT)
Age: 83
End: 2023-08-04
Payer: MEDICARE

## 2023-08-04 VITALS
RESPIRATION RATE: 16 BRPM | HEIGHT: 70 IN | WEIGHT: 169 LBS | SYSTOLIC BLOOD PRESSURE: 127 MMHG | OXYGEN SATURATION: 99 % | TEMPERATURE: 97.4 F | DIASTOLIC BLOOD PRESSURE: 75 MMHG | BODY MASS INDEX: 24.2 KG/M2 | HEART RATE: 76 BPM

## 2023-08-04 DIAGNOSIS — N40.0 BENIGN PROSTATIC HYPERPLASIA WITHOUT LOWER URINARY TRACT SYMPTOMS: ICD-10-CM

## 2023-08-04 DIAGNOSIS — B35.3 TINEA PEDIS, UNSPECIFIED LATERALITY: ICD-10-CM

## 2023-08-04 DIAGNOSIS — I25.10 CORONARY ARTERY DISEASE INVOLVING NATIVE CORONARY ARTERY OF NATIVE HEART WITHOUT ANGINA PECTORIS: Primary | ICD-10-CM

## 2023-08-04 DIAGNOSIS — Z00.00 MEDICARE ANNUAL WELLNESS VISIT, SUBSEQUENT: ICD-10-CM

## 2023-08-04 DIAGNOSIS — E78.00 HYPERCHOLESTEROLEMIA: ICD-10-CM

## 2023-08-04 PROCEDURE — G8420 CALC BMI NORM PARAMETERS: HCPCS | Performed by: FAMILY MEDICINE

## 2023-08-04 PROCEDURE — 99214 OFFICE O/P EST MOD 30 MIN: CPT | Performed by: FAMILY MEDICINE

## 2023-08-04 PROCEDURE — G8427 DOCREV CUR MEDS BY ELIG CLIN: HCPCS | Performed by: FAMILY MEDICINE

## 2023-08-04 PROCEDURE — 1123F ACP DISCUSS/DSCN MKR DOCD: CPT | Performed by: FAMILY MEDICINE

## 2023-08-04 PROCEDURE — G0439 PPPS, SUBSEQ VISIT: HCPCS | Performed by: FAMILY MEDICINE

## 2023-08-04 PROCEDURE — 1036F TOBACCO NON-USER: CPT | Performed by: FAMILY MEDICINE

## 2023-08-04 SDOH — ECONOMIC STABILITY: FOOD INSECURITY: WITHIN THE PAST 12 MONTHS, YOU WORRIED THAT YOUR FOOD WOULD RUN OUT BEFORE YOU GOT MONEY TO BUY MORE.: NEVER TRUE

## 2023-08-04 SDOH — ECONOMIC STABILITY: HOUSING INSECURITY
IN THE LAST 12 MONTHS, WAS THERE A TIME WHEN YOU DID NOT HAVE A STEADY PLACE TO SLEEP OR SLEPT IN A SHELTER (INCLUDING NOW)?: NO

## 2023-08-04 SDOH — ECONOMIC STABILITY: FOOD INSECURITY: WITHIN THE PAST 12 MONTHS, THE FOOD YOU BOUGHT JUST DIDN'T LAST AND YOU DIDN'T HAVE MONEY TO GET MORE.: NEVER TRUE

## 2023-08-04 SDOH — ECONOMIC STABILITY: INCOME INSECURITY: HOW HARD IS IT FOR YOU TO PAY FOR THE VERY BASICS LIKE FOOD, HOUSING, MEDICAL CARE, AND HEATING?: NOT HARD AT ALL

## 2023-08-04 ASSESSMENT — LIFESTYLE VARIABLES
HOW MANY STANDARD DRINKS CONTAINING ALCOHOL DO YOU HAVE ON A TYPICAL DAY: PATIENT DOES NOT DRINK
HOW OFTEN DO YOU HAVE A DRINK CONTAINING ALCOHOL: NEVER

## 2023-08-04 ASSESSMENT — PATIENT HEALTH QUESTIONNAIRE - PHQ9
SUM OF ALL RESPONSES TO PHQ QUESTIONS 1-9: 0
SUM OF ALL RESPONSES TO PHQ9 QUESTIONS 1 & 2: 0
2. FEELING DOWN, DEPRESSED OR HOPELESS: 0
SUM OF ALL RESPONSES TO PHQ QUESTIONS 1-9: 0
SUM OF ALL RESPONSES TO PHQ QUESTIONS 1-9: 0
1. LITTLE INTEREST OR PLEASURE IN DOING THINGS: 0
SUM OF ALL RESPONSES TO PHQ QUESTIONS 1-9: 0

## 2023-08-04 NOTE — PROGRESS NOTES
1. \"Have you been to the ER, urgent care clinic since your last visit? Hospitalized since your last visit? \" No     2. \"Have you seen or consulted any other health care providers outside of the [address] [place] [address] since your last visit? \" No      3. For patients aged 43-73: Has the patient had a colonoscopy / FIT/ Cologuard?  Yes

## 2023-08-04 NOTE — PATIENT INSTRUCTIONS
or liability for your use of this information. Personalized Preventive Plan for Osei Mejía - 8/4/2023  Medicare offers a range of preventive health benefits. Some of the tests and screenings are paid in full while other may be subject to a deductible, co-insurance, and/or copay. Some of these benefits include a comprehensive review of your medical history including lifestyle, illnesses that may run in your family, and various assessments and screenings as appropriate. After reviewing your medical record and screening and assessments performed today your provider may have ordered immunizations, labs, imaging, and/or referrals for you. A list of these orders (if applicable) as well as your Preventive Care list are included within your After Visit Summary for your review. Other Preventive Recommendations:    A preventive eye exam performed by an eye specialist is recommended every 1-2 years to screen for glaucoma; cataracts, macular degeneration, and other eye disorders. A preventive dental visit is recommended every 6 months. Try to get at least 150 minutes of exercise per week or 10,000 steps per day on a pedometer . Order or download the FREE \"Exercise & Physical Activity: Your Everyday Guide\" from [employer]. Call 6-836.382.8928 or search [employer] online. You need 7576-3723 mg of calcium and 5225-3015 IU of vitamin D per day. It is possible to meet your calcium requirement with diet alone, but a vitamin D supplement is usually necessary to meet this goal.  When exposed to the sun, use a sunscreen that protects against both UVA and UVB radiation with an SPF of 30 or greater. Reapply every 2 to 3 hours or after sweating, drying off with a towel, or swimming. Always wear a seat belt when traveling in a car. Always wear a helmet when riding a bicycle or motorcycle.

## 2023-08-05 LAB
ALBUMIN SERPL-MCNC: 4 G/DL (ref 3.5–5)
ALBUMIN/GLOB SERPL: 1.4 (ref 1.1–2.2)
ALP SERPL-CCNC: 120 U/L (ref 45–117)
ALT SERPL-CCNC: 25 U/L (ref 12–78)
ANION GAP SERPL CALC-SCNC: 2 MMOL/L (ref 5–15)
AST SERPL-CCNC: 22 U/L (ref 15–37)
BASOPHILS # BLD: 0 K/UL (ref 0–0.1)
BASOPHILS NFR BLD: 1 % (ref 0–1)
BILIRUB SERPL-MCNC: 0.9 MG/DL (ref 0.2–1)
BUN SERPL-MCNC: 17 MG/DL (ref 6–20)
BUN/CREAT SERPL: 15 (ref 12–20)
CALCIUM SERPL-MCNC: 8.8 MG/DL (ref 8.5–10.1)
CHLORIDE SERPL-SCNC: 109 MMOL/L (ref 97–108)
CHOLEST SERPL-MCNC: 127 MG/DL
CO2 SERPL-SCNC: 29 MMOL/L (ref 21–32)
CREAT SERPL-MCNC: 1.12 MG/DL (ref 0.7–1.3)
DIFFERENTIAL METHOD BLD: ABNORMAL
EOSINOPHIL # BLD: 0.2 K/UL (ref 0–0.4)
EOSINOPHIL NFR BLD: 4 % (ref 0–7)
ERYTHROCYTE [DISTWIDTH] IN BLOOD BY AUTOMATED COUNT: 12.7 % (ref 11.5–14.5)
GLOBULIN SER CALC-MCNC: 2.9 G/DL (ref 2–4)
GLUCOSE SERPL-MCNC: 91 MG/DL (ref 65–100)
HCT VFR BLD AUTO: 47.3 % (ref 36.6–50.3)
HDLC SERPL-MCNC: 41 MG/DL
HDLC SERPL: 3.1 (ref 0–5)
HGB BLD-MCNC: 14.8 G/DL (ref 12.1–17)
IMM GRANULOCYTES # BLD AUTO: 0 K/UL (ref 0–0.04)
IMM GRANULOCYTES NFR BLD AUTO: 0 % (ref 0–0.5)
LDLC SERPL CALC-MCNC: 76 MG/DL (ref 0–100)
LYMPHOCYTES # BLD: 1.3 K/UL (ref 0.8–3.5)
LYMPHOCYTES NFR BLD: 31 % (ref 12–49)
MCH RBC QN AUTO: 29.6 PG (ref 26–34)
MCHC RBC AUTO-ENTMCNC: 31.3 G/DL (ref 30–36.5)
MCV RBC AUTO: 94.6 FL (ref 80–99)
MONOCYTES # BLD: 0.5 K/UL (ref 0–1)
MONOCYTES NFR BLD: 11 % (ref 5–13)
NEUTS SEG # BLD: 2.2 K/UL (ref 1.8–8)
NEUTS SEG NFR BLD: 53 % (ref 32–75)
NRBC # BLD: 0 K/UL (ref 0–0.01)
NRBC BLD-RTO: 0 PER 100 WBC
PLATELET # BLD AUTO: 144 K/UL (ref 150–400)
PMV BLD AUTO: 10.5 FL (ref 8.9–12.9)
POTASSIUM SERPL-SCNC: 4.1 MMOL/L (ref 3.5–5.1)
PROT SERPL-MCNC: 6.9 G/DL (ref 6.4–8.2)
RBC # BLD AUTO: 5 M/UL (ref 4.1–5.7)
SODIUM SERPL-SCNC: 140 MMOL/L (ref 136–145)
TRIGL SERPL-MCNC: 50 MG/DL
VLDLC SERPL CALC-MCNC: 10 MG/DL
WBC # BLD AUTO: 4.1 K/UL (ref 4.1–11.1)

## 2024-06-17 ENCOUNTER — TELEPHONE (OUTPATIENT)
Age: 84
End: 2024-06-17

## 2024-06-17 NOTE — TELEPHONE ENCOUNTER
Caller states:    Sleeping issue  Blood pools into feet at night and they get red and hot  States at wits end  Wants call from nurse and referral    Is this a new problem:    yes - offered appts  Pt declined in office with pcp on wed at 4:30 and friday at 9 am. (States no thank you, then disconnected)    Date of last appointment:    8/4/2023     Please call patient back to advise.                 Caller confirms readback of documented phone/fax number(s) as correct.    Caller advised that there can be a 24-48 business hour turn around time on callbacks.    Caller advised that if pt deems they cannot wait any longer or symptoms worsen, ED or UC would be another option to consider for immediate treatment.

## 2024-06-18 ENCOUNTER — APPOINTMENT (OUTPATIENT)
Age: 84
End: 2024-06-18
Payer: MEDICARE

## 2024-06-18 ENCOUNTER — TELEMEDICINE (OUTPATIENT)
Age: 84
End: 2024-06-18
Payer: MEDICARE

## 2024-06-18 DIAGNOSIS — R20.2 PARESTHESIA: Primary | ICD-10-CM

## 2024-06-18 DIAGNOSIS — M79.671 PAIN IN BOTH FEET: ICD-10-CM

## 2024-06-18 DIAGNOSIS — M79.672 PAIN IN BOTH FEET: ICD-10-CM

## 2024-06-18 DIAGNOSIS — R20.2 PARESTHESIA: ICD-10-CM

## 2024-06-18 PROCEDURE — 99213 OFFICE O/P EST LOW 20 MIN: CPT | Performed by: FAMILY MEDICINE

## 2024-06-18 PROCEDURE — 1036F TOBACCO NON-USER: CPT | Performed by: FAMILY MEDICINE

## 2024-06-18 PROCEDURE — G8420 CALC BMI NORM PARAMETERS: HCPCS | Performed by: FAMILY MEDICINE

## 2024-06-18 PROCEDURE — G8427 DOCREV CUR MEDS BY ELIG CLIN: HCPCS | Performed by: FAMILY MEDICINE

## 2024-06-18 PROCEDURE — 1123F ACP DISCUSS/DSCN MKR DOCD: CPT | Performed by: FAMILY MEDICINE

## 2024-06-18 SDOH — ECONOMIC STABILITY: FOOD INSECURITY: WITHIN THE PAST 12 MONTHS, THE FOOD YOU BOUGHT JUST DIDN'T LAST AND YOU DIDN'T HAVE MONEY TO GET MORE.: NEVER TRUE

## 2024-06-18 SDOH — ECONOMIC STABILITY: FOOD INSECURITY: WITHIN THE PAST 12 MONTHS, YOU WORRIED THAT YOUR FOOD WOULD RUN OUT BEFORE YOU GOT MONEY TO BUY MORE.: NEVER TRUE

## 2024-06-18 ASSESSMENT — PATIENT HEALTH QUESTIONNAIRE - PHQ9
2. FEELING DOWN, DEPRESSED OR HOPELESS: NOT AT ALL
1. LITTLE INTEREST OR PLEASURE IN DOING THINGS: NOT AT ALL
SUM OF ALL RESPONSES TO PHQ QUESTIONS 1-9: 0
SUM OF ALL RESPONSES TO PHQ9 QUESTIONS 1 & 2: 0

## 2024-06-18 NOTE — PROGRESS NOTES
Washington Razo is a 83 y.o. male who presents with  concern of both feet turning red and warm to touch with a tingling sensation.  He feels the symptoms have become worse over the last few months.  Reports feet will get hot and red, can be both or either foot. Reports worse at night, awakening with burning and tingling in feet.   No claudication.  No swelling.  Patient is concerned he has erythromelalgia      Washington Razo, was evaluated through a synchronous (real-time) audio-video encounter. The patient (or guardian if applicable) is aware that this is a billable service, which includes applicable co-pays. This Virtual Visit was conducted with patient's (and/or legal guardian's) consent. Patient identification was verified, and a caregiver was present when appropriate.   The patient was located at Home: [address] [place] [address] [address] [place] [address]  Provider was located at Home (Appt Dept State): [place]  Confirm you are appropriately licensed, registered, or certified to deliver care in the state where the patient is located as indicated above. If you are not or unsure, please re-schedule the visit: Yes, I confirm.        Are you appropriately licensed in the patient's state?: Yes      Total time spent for this encounter: Not billed by time    --Coral Wolff MD on 2024     An electronic signature was used to authenticate this note.       Past Medical History:   Diagnosis Date    Actinic keratosis, hx of     Actinic Keratoses Head, limbs    Allergic rhinitis     Benign prostatic hypertrophy     CAD (coronary artery disease)     anterior MI ;  by cath 2008    Hypercholesterolemia     Lucien-Parkinson-White (WPW) syndrome 1973    status post ablation       Family History   Problem Relation Age of Onset    Cancer Father 69        Brain    Other Father         Cholitis    Heart Disease Maternal Grandfather     Cancer Paternal Uncle     Asthma Paternal Grandmother     No Known Problems Daughter     No Known

## 2024-06-18 NOTE — PROGRESS NOTES
Patient identified with two identification factors, Name and Date of Birth.    Chief Complaint   Patient presents with    Referral - General     Pt stated he has been experiencing blood draining in your feet, bilateral feet turing red with warm to touch with a tingling sensation. Pt stated this issue has became worse over the last couple months            6/18/2024     8:39 AM   Patient-Reported Vitals   Patient-Reported Weight 166lbs   Patient-Reported Height 5'10   Patient-Reported Systolic 132 mmHg   Patient-Reported Diastolic 83 mmHg   Patient-Reported Pulse 80       1. \"Have you been to the ER, urgent care clinic since your last visit?  Hospitalized since your last visit?\" No     2. \"Have you seen or consulted any other health care providers outside of the [facility] since your last visit?\" Ni

## 2024-06-19 LAB
ALBUMIN SERPL-MCNC: 3.9 G/DL (ref 3.5–5)
ALBUMIN/GLOB SERPL: 1.3 (ref 1.1–2.2)
ALP SERPL-CCNC: 118 U/L (ref 45–117)
ALT SERPL-CCNC: 23 U/L (ref 12–78)
ANION GAP SERPL CALC-SCNC: 3 MMOL/L (ref 5–15)
AST SERPL-CCNC: 19 U/L (ref 15–37)
BASOPHILS # BLD: 0 K/UL (ref 0–0.1)
BASOPHILS NFR BLD: 1 % (ref 0–1)
BILIRUB SERPL-MCNC: 0.9 MG/DL (ref 0.2–1)
BUN SERPL-MCNC: 18 MG/DL (ref 6–20)
BUN/CREAT SERPL: 16 (ref 12–20)
CALCIUM SERPL-MCNC: 9.4 MG/DL (ref 8.5–10.1)
CHLORIDE SERPL-SCNC: 110 MMOL/L (ref 97–108)
CO2 SERPL-SCNC: 28 MMOL/L (ref 21–32)
CREAT SERPL-MCNC: 1.1 MG/DL (ref 0.7–1.3)
DIFFERENTIAL METHOD BLD: NORMAL
EOSINOPHIL # BLD: 0.1 K/UL (ref 0–0.4)
EOSINOPHIL NFR BLD: 3 % (ref 0–7)
ERYTHROCYTE [DISTWIDTH] IN BLOOD BY AUTOMATED COUNT: 12.9 % (ref 11.5–14.5)
GLOBULIN SER CALC-MCNC: 3 G/DL (ref 2–4)
GLUCOSE SERPL-MCNC: 103 MG/DL (ref 65–100)
HCT VFR BLD AUTO: 44.1 % (ref 36.6–50.3)
HGB BLD-MCNC: 14.7 G/DL (ref 12.1–17)
IMM GRANULOCYTES # BLD AUTO: 0 K/UL (ref 0–0.04)
IMM GRANULOCYTES NFR BLD AUTO: 0 % (ref 0–0.5)
LYMPHOCYTES # BLD: 1.2 K/UL (ref 0.8–3.5)
LYMPHOCYTES NFR BLD: 29 % (ref 12–49)
MCH RBC QN AUTO: 30.4 PG (ref 26–34)
MCHC RBC AUTO-ENTMCNC: 33.3 G/DL (ref 30–36.5)
MCV RBC AUTO: 91.1 FL (ref 80–99)
MONOCYTES # BLD: 0.5 K/UL (ref 0–1)
MONOCYTES NFR BLD: 13 % (ref 5–13)
NEUTS SEG # BLD: 2.3 K/UL (ref 1.8–8)
NEUTS SEG NFR BLD: 54 % (ref 32–75)
NRBC # BLD: 0 K/UL (ref 0–0.01)
NRBC BLD-RTO: 0 PER 100 WBC
PLATELET # BLD AUTO: 157 K/UL (ref 150–400)
PMV BLD AUTO: 10.7 FL (ref 8.9–12.9)
POTASSIUM SERPL-SCNC: 4.6 MMOL/L (ref 3.5–5.1)
PROT SERPL-MCNC: 6.9 G/DL (ref 6.4–8.2)
RBC # BLD AUTO: 4.84 M/UL (ref 4.1–5.7)
SODIUM SERPL-SCNC: 141 MMOL/L (ref 136–145)
TSH SERPL DL<=0.05 MIU/L-ACNC: 0.91 UIU/ML (ref 0.36–3.74)
VIT B12 SERPL-MCNC: 136 PG/ML (ref 193–986)
WBC # BLD AUTO: 4.2 K/UL (ref 4.1–11.1)

## 2024-07-15 ENCOUNTER — TELEPHONE (OUTPATIENT)
Age: 84
End: 2024-07-15

## 2024-10-19 ENCOUNTER — PATIENT MESSAGE (OUTPATIENT)
Age: 84
End: 2024-10-19

## 2024-10-21 RX ORDER — IPRATROPIUM BROMIDE 21 UG/1
2 SPRAY, METERED NASAL 3 TIMES DAILY PRN
Qty: 30 ML | Refills: 3 | Status: SHIPPED | OUTPATIENT
Start: 2024-10-21 | End: 2024-10-22 | Stop reason: SDUPTHER

## 2024-10-21 NOTE — TELEPHONE ENCOUNTER
PCP: Coral Wolff MD    Last appt:   2024    No future appointments.    Requested Prescriptions     Pending Prescriptions Disp Refills    ipratropium (ATROVENT) 0.03 % nasal spray 30 mL 3     Si sprays by Nasal route 3 times daily as needed for Rhinitis

## 2024-10-22 ENCOUNTER — TELEPHONE (OUTPATIENT)
Age: 84
End: 2024-10-22

## 2024-10-22 RX ORDER — IPRATROPIUM BROMIDE 21 UG/1
2 SPRAY, METERED NASAL 3 TIMES DAILY PRN
Qty: 60 ML | Refills: 3 | Status: SHIPPED | OUTPATIENT
Start: 2024-10-22

## (undated) DEVICE — SOLIDIFIER MEDC 1200ML -- CONVERT TO 356117

## (undated) DEVICE — Device

## (undated) DEVICE — SET ADMIN 16ML TBNG L100IN 2 Y INJ SITE IV PIGGY BK DISP

## (undated) DEVICE — 1200 GUARD II KIT W/5MM TUBE W/O VAC TUBE: Brand: GUARDIAN

## (undated) DEVICE — SYR 10ML LUER LOK 1/5ML GRAD --

## (undated) DEVICE — BASIN EMSIS 16OZ GRAPHITE PLAS KID SHP MOLD GRAD FOR ORAL

## (undated) DEVICE — TOWEL 4 PLY TISS 19X30 SUE WHT

## (undated) DEVICE — NEONATAL-ADULT SPO2 SENSOR: Brand: NELLCOR

## (undated) DEVICE — KENDALL RADIOLUCENT FOAM MONITORING ELECTRODE RECTANGULAR SHAPE: Brand: KENDALL

## (undated) DEVICE — NEEDLE HYPO 18GA L1.5IN PNK S STL HUB POLYPR SHLD REG BVL

## (undated) DEVICE — CATH IV AUTOGRD BC PNK 20GA 25 -- INSYTE

## (undated) DEVICE — SYR 3ML LL TIP 1/10ML GRAD --

## (undated) DEVICE — Z DISCONTINUED PER MEDLINE LINE GAS SAMPLING O2/CO2 LNG AD 13 FT NSL W/ TBNG FILTERLINE